# Patient Record
Sex: MALE | Race: BLACK OR AFRICAN AMERICAN | NOT HISPANIC OR LATINO | Employment: FULL TIME | URBAN - METROPOLITAN AREA
[De-identification: names, ages, dates, MRNs, and addresses within clinical notes are randomized per-mention and may not be internally consistent; named-entity substitution may affect disease eponyms.]

---

## 2020-08-12 ENCOUNTER — OFFICE VISIT (OUTPATIENT)
Dept: URGENT CARE | Facility: CLINIC | Age: 30
End: 2020-08-12
Payer: COMMERCIAL

## 2020-08-12 VITALS
BODY MASS INDEX: 43.95 KG/M2 | DIASTOLIC BLOOD PRESSURE: 85 MMHG | WEIGHT: 290 LBS | HEIGHT: 68 IN | OXYGEN SATURATION: 95 % | HEART RATE: 100 BPM | SYSTOLIC BLOOD PRESSURE: 126 MMHG | RESPIRATION RATE: 16 BRPM | TEMPERATURE: 98 F

## 2020-08-12 DIAGNOSIS — R11.0 NAUSEA: ICD-10-CM

## 2020-08-12 DIAGNOSIS — R05.9 COUGH: ICD-10-CM

## 2020-08-12 DIAGNOSIS — B34.9 VIRAL ILLNESS: ICD-10-CM

## 2020-08-12 DIAGNOSIS — R50.9 FEVER, UNSPECIFIED FEVER CAUSE: Primary | ICD-10-CM

## 2020-08-12 DIAGNOSIS — Z71.1 CONCERN ABOUT STD IN MALE WITHOUT DIAGNOSIS: ICD-10-CM

## 2020-08-12 LAB
CTP QC/QA: YES
MOLECULAR STREP A: NEGATIVE

## 2020-08-12 PROCEDURE — 99203 PR OFFICE/OUTPT VISIT, NEW, LEVL III, 30-44 MIN: ICD-10-PCS | Mod: S$GLB,,, | Performed by: FAMILY MEDICINE

## 2020-08-12 PROCEDURE — 99203 OFFICE O/P NEW LOW 30 MIN: CPT | Mod: S$GLB,,, | Performed by: FAMILY MEDICINE

## 2020-08-12 PROCEDURE — 87661 TRICHOMONAS VAGINALIS AMPLIF: CPT

## 2020-08-12 PROCEDURE — 86592 SYPHILIS TEST NON-TREP QUAL: CPT

## 2020-08-12 PROCEDURE — 87651 STREP A DNA AMP PROBE: CPT | Mod: QW,S$GLB,, | Performed by: FAMILY MEDICINE

## 2020-08-12 PROCEDURE — 86703 HIV-1/HIV-2 1 RESULT ANTBDY: CPT

## 2020-08-12 PROCEDURE — 87491 CHLMYD TRACH DNA AMP PROBE: CPT

## 2020-08-12 PROCEDURE — 87651 POCT STREP A MOLECULAR: ICD-10-PCS | Mod: QW,S$GLB,, | Performed by: FAMILY MEDICINE

## 2020-08-12 PROCEDURE — 80074 ACUTE HEPATITIS PANEL: CPT

## 2020-08-12 RX ORDER — ONDANSETRON 4 MG/1
4 TABLET, ORALLY DISINTEGRATING ORAL EVERY 6 HOURS PRN
Qty: 30 TABLET | Refills: 0 | Status: SHIPPED | OUTPATIENT
Start: 2020-08-12 | End: 2023-10-02

## 2020-08-12 RX ORDER — BENZONATATE 200 MG/1
200 CAPSULE ORAL 3 TIMES DAILY PRN
Qty: 30 CAPSULE | Refills: 0 | Status: SHIPPED | OUTPATIENT
Start: 2020-08-12 | End: 2023-10-02

## 2020-08-12 NOTE — PROGRESS NOTES
"Subjective:       Patient ID: Phil Leung is a 30 y.o. male.    Vitals:  height is 5' 8" (1.727 m) and weight is 131.5 kg (290 lb). His tympanic temperature is 98.1 °F (36.7 °C). His blood pressure is 126/85 and his pulse is 100. His respiration is 16 and oxygen saturation is 95%.     Chief Complaint: Fever and Cough    Patient reports fever for past 4 days- highest 102. Patient reports dry cough started yesterday and also nausea vomiting no blood- able to keep down fluids.Patient is waiting on covid test he had done yesterday at a community testing site.  He has not been around anyone he knows specifically is positive for COVID, no chest pain shortness of breath abdominal pain dysuria hematuria.     Patient mentions he would like STD test while he is here no specific concerns just likes to get a full panel every now and then    Fever   This is a new problem. Episode onset: 4 days. The problem occurs intermittently. The problem has been unchanged. The temperature was taken using an oral thermometer. Associated symptoms include coughing (dry off and on), headaches (off and on), muscle aches, nausea, a sore throat and vomiting. Pertinent negatives include no congestion, ear pain, rash or wheezing. He has tried nothing for the symptoms.   Risk factors: no sick contacts    Cough  This is a new problem. The current episode started yesterday. The problem has been unchanged. The problem occurs every few hours. The cough is non-productive. Associated symptoms include a fever (highest at home 102), headaches (off and on), myalgias and a sore throat. Pertinent negatives include no chills, ear pain, eye redness, hemoptysis, postnasal drip, rash, rhinorrhea, shortness of breath or wheezing. Nothing aggravates the symptoms. He has tried nothing for the symptoms. His past medical history is significant for asthma (as a child). There is no history of bronchitis.       Constitution: Positive for fever (highest at home 102). " Negative for chills, sweating and fatigue.   HENT: Positive for sore throat. Negative for ear pain, congestion, postnasal drip, sinus pain, sinus pressure and voice change.         Sores on tongue and gums swollen   Neck: Negative for painful lymph nodes.   Eyes: Negative for eye redness.   Respiratory: Positive for cough (dry off and on). Negative for chest tightness, sputum production, bloody sputum, COPD, shortness of breath, stridor, wheezing and asthma.    Gastrointestinal: Positive for nausea and vomiting.   Musculoskeletal: Positive for muscle ache.   Skin: Negative for rash.   Allergic/Immunologic: Negative for seasonal allergies and asthma.   Neurological: Positive for headaches (off and on).   Hematologic/Lymphatic: Negative for swollen lymph nodes.       Objective:      Physical Exam   Constitutional: He is oriented to person, place, and time. He appears well-developed. He is cooperative.  Non-toxic appearance. He does not appear ill. No distress.   HENT:   Head: Normocephalic and atraumatic.   Ears:   Right Ear: Hearing, tympanic membrane, external ear and ear canal normal. No middle ear effusion.   Left Ear: Hearing, tympanic membrane, external ear and ear canal normal.  No middle ear effusion.   Nose: Nose normal. No mucosal edema, rhinorrhea or nasal deformity. No epistaxis. Right sinus exhibits no maxillary sinus tenderness and no frontal sinus tenderness. Left sinus exhibits no maxillary sinus tenderness and no frontal sinus tenderness.   Mouth/Throat: Uvula is midline and mucous membranes are normal. No trismus in the jaw. Normal dentition. No uvula swelling. Posterior oropharyngeal erythema present. No oropharyngeal exudate, posterior oropharyngeal edema or tonsillar abscesses. Tonsils are 1+ on the right. Tonsils are 1+ on the left. Tonsillar exudate.       Eyes: Conjunctivae and lids are normal. No scleral icterus.   Neck: Trachea normal, full passive range of motion without pain and phonation  normal. Neck supple. No neck rigidity. No edema and no erythema present.   Cardiovascular: Normal rate, regular rhythm, normal heart sounds and normal pulses.   Pulmonary/Chest: Effort normal and breath sounds normal. No accessory muscle usage. No tachypnea. No respiratory distress. He has no decreased breath sounds. He has no wheezes. He has no rhonchi. He has no rales.   NAD able to speak in clear complete sentences without difficulty      Comments: NAD able to speak in clear complete sentences without difficulty      Abdominal: Soft. Normal appearance and bowel sounds are normal. There is no abdominal tenderness. There is no rebound, no guarding, no tenderness at McBurney's point and negative Michelle's sign. No hernia.      Comments: Abdomen soft no rebound tenderness or guarding   Genitourinary:         Comments: deferred     Musculoskeletal: Normal range of motion.         General: No deformity.   Neurological: He is alert and oriented to person, place, and time. He exhibits normal muscle tone. Coordination normal.   Skin: Skin is warm, dry, intact, not diaphoretic and not pale. Psychiatric: His speech is normal and behavior is normal. Judgment and thought content normal.   Nursing note and vitals reviewed.    Results for orders placed or performed in visit on 08/12/20   POCT Strep A, Molecular   Result Value Ref Range    Molecular Strep A, POC Negative Negative     Acceptable Yes            Assessment:       1. Fever, unspecified fever cause    2. Cough    3. Nausea    4. Viral illness    5. Concern about STD in male without diagnosis        Plan:         Fever, unspecified fever cause  -     POCT Strep A, Molecular    Cough  -     benzonatate (TESSALON) 200 MG capsule; Take 1 capsule (200 mg total) by mouth 3 (three) times daily as needed for Cough.  Dispense: 30 capsule; Refill: 0    Nausea  -     ondansetron (ZOFRAN-ODT) 4 MG TbDL; Take 1 tablet (4 mg total) by mouth every 6 (six) hours as  needed.  Dispense: 30 tablet; Refill: 0    Viral illness    Concern about STD in male without diagnosis  -     C. trachomatis/N. gonorrhoeae by AMP DNA  -     Hepatitis Panel, Acute  -     RPR  -     Trichomonas vaginalis, RNA, Qual, Urine  -     HIV 1/2 Ag/Ab (4th Gen)      Patient Instructions   PLEASE READ YOUR DISCHARGE INSTRUCTIONS ENTIRELY AS IT CONTAINS IMPORTANT INFORMATION.    Please go home and quarantine yourself until your test results    You can return to work/school  If negative: 24 hours without fever without fever reducing medications  If positive: at least 10 days from when symptoms started AND 3 days without fever without taking fever reducing medications AND improvement in respiratory symptoms (cough, chest pain, shortness of breath)    Monitor for any worsening symptoms if anything severely worsening please go to the ER immediately.     Tylenol and ibuprofen as needed    Drink plenty of fluids rest    Wash hands frequently cover your cough and sneeze    Tessalon Perles as needed for cough    Zofran as needed for nausea let it dissolve under the tongue    Greenback foods like soups crackers toast    STD testing will take about 3-5 days.  Increase condom use to prevent further occurance.  Notify sexual partners of the need for testing.  Complete ALL medication prescribed.  NO sexual intercourse for 7 days after treatment. Retest to ensure infection has cleared-there are infections that require more agressive treatment.  Retest for all ini 6 weeks and again in 6 monts to ensure true negative results.  Today's testing will give no crediable information if you have unprotected sexual activities going forward. Syphillis cases are rising!  Gonorrhea has RESISTANT strains which is why repeat testing after treatment is important.  Gonorrhea may be present in multiple sites from just ONE area of exposure.  For those who have high risk sexual behaviors and are on Truvada for PrEP- you have additional protection  against HIV ONLY.  REMEMBER WEAR CONDOMS AND GET TESTED OFTEN.   Do not have sex for 1 week, and no unprotected sex for 3 weeks.      Instructions for Home Care of Patients and Caretakers with Coronavirus Disease 2019     Limit visitors to the home.  Older persons and those that have chronic medical conditions such as diabetes, lung and heart disease are at increased risk for illness.    If possible, patients should use a separate bedroom while recovering. Caregivers and household members should avoid prolonged contact with the patient which means to stay 6 feet away and avoid contact with cough droplets.  When close contact is necessary, wash your hands before and immediately after contact.    Perform hand hygiene frequently. Wash your hands often with soap and water for at least 20 seconds or use an alcohol-based hand , covering all surfaces of your hands and rubbing them together until they feel dry.    Avoid touching your eyes, nose, and mouth with unwashed hands.   Avoid sharing household items with the patient. You should not share dishes, drinking glasses, cups, eating utensils, towels, bedding, or other items. After the patient uses these items, you should wash them thoroughly.   Wash laundry thoroughly.   o Immediately remove and wash clothes or bedding that have blood, stool, or body fluids on them.   Clean all high-touch surfaces, such as counters, tabletops, doorknobs, bathroom fixtures, toilets, phones, keyboards, tablets, and bedside tables, every day.   o Use a household cleaning spray or wipe, according to the label instructions. Labels contain instructions for safe and effective use of the cleaning product including precautions you should take when applying the product, such as wearing gloves and making sure you have good ventilation during use of the product.    For more information see CDC link below.   "    https://www.cdc.gov/coronavirus/2019-ncov/hcp/guidance-prevent-spread.html#precautions               If your symptoms worsen or if you have any other concerns, please contact [facility] at [phone].       You must understand that you have received an Urgent Care treatment only and that you may be released before all of your medical problems are known or treated.      Viral Syndrome (Adult)  A viral illness may cause a number of symptoms. The symptoms depend on the part of the body that the virus affects. If it settles in your nose, throat, and lungs, it may cause cough, sore throat, congestion, and sometimes headache. If it settles in your stomach and intestinal tract, it may cause vomiting and diarrhea. Sometimes it causes vague symptoms like "aching all over," feeling tired, loss of appetite, or fever.  A viral illness usually lasts 1 to 2 weeks, but sometimes it lasts longer. In some cases, a more serious infection can look like a viral syndrome in the first few days of the illness. You may need another exam and additional tests to know the difference. Watch for the warning signs listed below.  Home care  Follow these guidelines for taking care of yourself at home:  · If symptoms are severe, rest at home for the first 2 to 3 days.  · Stay away from cigarette smoke - both your smoke and the smoke from others.  · You may use over-the-counter acetaminophen or ibuprofen for fever, muscle aching, and headache, unless another medicine was prescribed for this. If you have chronic liver or kidney disease or ever had a stomach ulcer or GI bleeding, talk with your doctor before using these medicines. No one who is younger than 18 and ill with a fever should take aspirin. It may cause severe disease or death.  · Your appetite may be poor, so a light diet is fine. Avoid dehydration by drinking 8 to 12 8-ounce glasses of fluids each day. This may include water; orange juice; lemonade; apple, grape, and cranberry " juice; clear fruit drinks; electrolyte replacement and sports drinks; and decaffeinated teas and coffee. If you have been diagnosed with a kidney disease, ask your doctor how much and what types of fluids you should drink to prevent dehydration. If you have kidney disease, drinking too much fluid can cause it build up in the your body and be dangerous to your health.  · Over-the-counter remedies won't shorten the length of the illness but may be helpful for cough, sore throat; and nasal and sinus congestion. Don't use decongestants if you have high blood pressure.  Follow-up care  Follow up with your healthcare provider if you do not improve over the next week.  Call 911  Get emergency medical care if any of the following occur:  · Convulsion  · Feeling weak, dizzy, or like you are going to faint  · Chest pain, shortness of breath, wheezing, or difficulty breathing  When to seek medical advice  Call your healthcare provider right away if any of these occur:  · Cough with lots of colored sputum (mucus) or blood in your sputum  · Chest pain, shortness of breath, wheezing, or difficulty breathing  · Severe headache; face, neck, or ear pain  · Severe, constant pain in the lower right side of your belly (abdominal)  · Continued vomiting (cant keep liquids down)  · Frequent diarrhea (more than 5 times a day); blood (red or black color) or mucus in diarrhea  · Feeling weak, dizzy, or like you are going to faint  · Extreme thirst  · Fever of 100.4°F (38°C) or higher, or as directed by your healthcare provider  Date Last Reviewed: 9/25/2015 © 2000-2017 [employer]. [address] [place] [address], [place] [address]. All rights reserved. This information is not intended as a substitute for professional medical care. Always follow your healthcare professional's instructions.      Instructions for Patients with Confirmed or Suspected COVID-19    If you are awaiting your test result, you will either be called or it will  be released to the patient portal.  If you have any questions about your test, please visit [email or web address] or call our COVID-19 information line at [phone].      Instructions for non-hospitalized or discharged patients with confirmed or suspected COVID-19:       Stay home except to get medical care.    Separate yourself from other people and animals in your home.    Call ahead before visiting your doctor.    Wear a face mask.    Cover your coughs and sneezes.    Clean your hands often.    Avoid sharing personal household items.    Clean all high-touch surfaces every day.    Monitor your symptoms. Seek prompt medical attention if your illness is worsening (e.g., difficulty breathing). Before seeking care, call your healthcare provider.    If you have a medical emergency and must call 911, notify the dispatcher that you have or are being evaluated for COVID-19. If possible, put on a face mask before emergency medical services arrive.    Use the following symptom-based strategy to return to normal activity following a suspected or confirmed case of COVID-19. Continue isolation until:   o At least 3 days (72 hours) have passed since recovery defined as resolution of fever without the use of fever-reducing medications and improvement in respiratory symptoms (e.g. cough, shortness of breath), and   o At least 10 days have passed since the first positive test.       As one of the next steps, you will receive a call or text from the [place] Department of Health ([facility]) COVID-19 Tracing Team. See the contact information below so you know not to ignore the health departments call. It is important that you contact them back immediately so they can help.     Contact Tracer Number:  [phone]  Caller ID for most carriers: [place] Dept Health    What is contact tracing?   Contact tracing is a process that helps identify everyone who has been in close contact with an infected person. Contact  tracers let those people know they may have been exposed and guide them on next steps. Confidentiality is important for everyone; no one will be told who may have exposed them to the virus.   Your involvement is important. The more we know about where and how this virus is spreading, the better chance we have at stopping it from spreading further.  What does exposure mean?   Exposure means you have been within 6 feet for more than 15 minutes with a person who has or had COVID-19.  What kind of questions do the contact tracers ask?   A contact tracer will confirm your basic contact information including name, address, phone number, and next of kin, as well as asking about any symptoms you may have had. Theyll also ask you how you think you may have gotten sick, such as places where you may have been exposed to the virus, and people you were with. Those names will never be shared with anyone outside of that call, and will only be used to help trace and stop the spread of the virus.   I have privacy concerns. How will the state use my information?   Your privacy about your health is important. All calls are completed using call centers that use the appropriate health privacy protection measures (HIPAA compliance), meaning that your patient information is safe. No one will ever ask you any questions related to immigration status. Your health comes first.   Do I have to participate?   You do not have to participate, but we strongly encourage you to. Contact tracing can help us catch and control new outbreaks as theyre developing to keep your friends and family safe.   What if I dont hear from anyone?   If you dont receive a call within 24 hours, you can call the number above right away to inquire about your status. That line is open from 8:00 am - 8:00 p.m., 7 days a week.  Contact tracing saves lives! Together, we have the power to beat this virus and keep our loved ones and neighbors safe.       Instructions  for household members, intimate partners and caregivers in a non-healthcare setting of a patient with confirmed or suspected COVID-19:         Close contacts should monitor their health and call their healthcare provider right away if they develop symptoms suggestive of COVID-19 (e.g., fever, cough, shortness of breath).    Stay home except to get medical care. Separate yourself from other people and animals in the home.   Monitor the patients symptoms. If the patient is getting sicker, call his or her healthcare provider. If the patient has a medical emergency and you need to call 911, notify the dispatch personnel that the patient has or is being evaluated for COVID-19.    Wear a facemask when around other people such as sharing a room or vehicle and before entering a healthcare provider's office.   Cover coughs and sneezes with a tissue. Throw used tissues in a lined trash can immediately and wash hands.   Clean hands often with soap and water for at least 20 seconds or with an alcohol-based hand , rubbing hands together until they feel dry. Avoid touching your eyes, nose, and mouth with unwashed hands.   Clean all high-touch; surfaces every day, including counters, tabletops, doorknobs, bathroom fixtures, toilets, phones, keyboards, tablets, bedside tables, etc. Use a household cleaning spray or wipe according to label instructions.   Avoid sharing personal household items such as dishes, drinking glasses, cups, towels, bedding, etc. After these items are used, they should be washed thoroughly with soap and water.   Continue isolation until:   At least 3 days (72 hours) have passed since recovery defined as resolution of fever without the use of fever-reducing medications and improvement in respiratory symptoms (e.g. cough, shortness of breath), and    At least 10 days have passed since the patients first positive  test.    https://www.cdc.gov/coronavirus/2019-ncov/your-health/index.htm

## 2020-08-12 NOTE — LETTER
[address] [facility] ? [place], [address] ? Phone [phone] ? Fax [phone]           Return to Work/School Status    Patient: Phil Leung  YOB: 1990   Date: 08/12/2020      [facility] has adopted CDCs time-based return to work/school strategy for persons with confirmed or suspected COVID19. [facility] does not recommend using a test-based strategy for returning to work/school after COVID-19 infection. CDC has reported prolonged positive test results without evidence of infectiousness. At this time, positive specimens capable of producing disease have not been isolated more than 9 days after onset of illness.   Symptomatic persons with confirmed COVID-19 or suspected COVID-19 can return to work/school after:    At least 3 days (72 hours) have passed since recovery defined as resolution of fever without the use of fever-reducing medications AND improvement in respiratory symptoms (e.g., cough, shortness of breath)    AND, at least 10 days have passed since first positive test  Asymptomatic persons with confirmed COVID-19 can return to work after:   At least 10 days have passed since the positive laboratory test and the person remains asymptomatic.  More information about the science behind the symptom-based return to work/school can be found at: https://www.cdc.gov/coronavirus/2019-ncov/community/wpmoxcuk-bqeshvymvtq-brslqzebd.html    Sincerely,      Pierre Thomas NP

## 2020-08-12 NOTE — PATIENT INSTRUCTIONS
PLEASE READ YOUR DISCHARGE INSTRUCTIONS ENTIRELY AS IT CONTAINS IMPORTANT INFORMATION.    Please go home and quarantine yourself until your test results    You can return to work/school  If negative: 24 hours without fever without fever reducing medications  If positive: at least 10 days from when symptoms started AND 3 days without fever without taking fever reducing medications AND improvement in respiratory symptoms (cough, chest pain, shortness of breath)    Monitor for any worsening symptoms if anything severely worsening please go to the ER immediately.     Tylenol and ibuprofen as needed    Drink plenty of fluids rest    Wash hands frequently cover your cough and sneeze    Tessalon Perles as needed for cough    Zofran as needed for nausea let it dissolve under the tongue    Hand foods like soups crackers toast    STD testing will take about 3-5 days.  Increase condom use to prevent further occurance.  Notify sexual partners of the need for testing.  Complete ALL medication prescribed.  NO sexual intercourse for 7 days after treatment. Retest to ensure infection has cleared-there are infections that require more agressive treatment.  Retest for all ini 6 weeks and again in 6 monts to ensure true negative results.  Today's testing will give no crediable information if you have unprotected sexual activities going forward. Syphillis cases are rising!  Gonorrhea has RESISTANT strains which is why repeat testing after treatment is important.  Gonorrhea may be present in multiple sites from just ONE area of exposure.  For those who have high risk sexual behaviors and are on Truvada for PrEP- you have additional protection against HIV ONLY.  REMEMBER WEAR CONDOMS AND GET TESTED OFTEN.   Do not have sex for 1 week, and no unprotected sex for 3 weeks.      Instructions for Home Care of Patients and Caretakers with Coronavirus Disease 2019     Limit visitors to the home.  Older persons and those that have chronic  medical conditions such as diabetes, lung and heart disease are at increased risk for illness.    If possible, patients should use a separate bedroom while recovering. Caregivers and household members should avoid prolonged contact with the patient which means to stay 6 feet away and avoid contact with cough droplets.  When close contact is necessary, wash your hands before and immediately after contact.    Perform hand hygiene frequently. Wash your hands often with soap and water for at least 20 seconds or use an alcohol-based hand , covering all surfaces of your hands and rubbing them together until they feel dry.    Avoid touching your eyes, nose, and mouth with unwashed hands.   Avoid sharing household items with the patient. You should not share dishes, drinking glasses, cups, eating utensils, towels, bedding, or other items. After the patient uses these items, you should wash them thoroughly.   Wash laundry thoroughly.   o Immediately remove and wash clothes or bedding that have blood, stool, or body fluids on them.   Clean all high-touch surfaces, such as counters, tabletops, doorknobs, bathroom fixtures, toilets, phones, keyboards, tablets, and bedside tables, every day.   o Use a household cleaning spray or wipe, according to the label instructions. Labels contain instructions for safe and effective use of the cleaning product including precautions you should take when applying the product, such as wearing gloves and making sure you have good ventilation during use of the product.    For more information see CDC link below.      https://www.cdc.gov/coronavirus/2019-ncov/hcp/guidance-prevent-spread.html#precautions               If your symptoms worsen or if you have any other concerns, please contact [facility] at [phone].       You must understand that you have received an Urgent Care treatment only and that you may be released before all of your medical problems are known or  "treated.      Viral Syndrome (Adult)  A viral illness may cause a number of symptoms. The symptoms depend on the part of the body that the virus affects. If it settles in your nose, throat, and lungs, it may cause cough, sore throat, congestion, and sometimes headache. If it settles in your stomach and intestinal tract, it may cause vomiting and diarrhea. Sometimes it causes vague symptoms like "aching all over," feeling tired, loss of appetite, or fever.  A viral illness usually lasts 1 to 2 weeks, but sometimes it lasts longer. In some cases, a more serious infection can look like a viral syndrome in the first few days of the illness. You may need another exam and additional tests to know the difference. Watch for the warning signs listed below.  Home care  Follow these guidelines for taking care of yourself at home:  · If symptoms are severe, rest at home for the first 2 to 3 days.  · Stay away from cigarette smoke - both your smoke and the smoke from others.  · You may use over-the-counter acetaminophen or ibuprofen for fever, muscle aching, and headache, unless another medicine was prescribed for this. If you have chronic liver or kidney disease or ever had a stomach ulcer or GI bleeding, talk with your doctor before using these medicines. No one who is younger than 18 and ill with a fever should take aspirin. It may cause severe disease or death.  · Your appetite may be poor, so a light diet is fine. Avoid dehydration by drinking 8 to 12 8-ounce glasses of fluids each day. This may include water; orange juice; lemonade; apple, grape, and cranberry juice; clear fruit drinks; electrolyte replacement and sports drinks; and decaffeinated teas and coffee. If you have been diagnosed with a kidney disease, ask your doctor how much and what types of fluids you should drink to prevent dehydration. If you have kidney disease, drinking too much fluid can cause it build up in the your body and be dangerous to your " health.  · Over-the-counter remedies won't shorten the length of the illness but may be helpful for cough, sore throat; and nasal and sinus congestion. Don't use decongestants if you have high blood pressure.  Follow-up care  Follow up with your healthcare provider if you do not improve over the next week.  Call 911  Get emergency medical care if any of the following occur:  · Convulsion  · Feeling weak, dizzy, or like you are going to faint  · Chest pain, shortness of breath, wheezing, or difficulty breathing  When to seek medical advice  Call your healthcare provider right away if any of these occur:  · Cough with lots of colored sputum (mucus) or blood in your sputum  · Chest pain, shortness of breath, wheezing, or difficulty breathing  · Severe headache; face, neck, or ear pain  · Severe, constant pain in the lower right side of your belly (abdominal)  · Continued vomiting (cant keep liquids down)  · Frequent diarrhea (more than 5 times a day); blood (red or black color) or mucus in diarrhea  · Feeling weak, dizzy, or like you are going to faint  · Extreme thirst  · Fever of 100.4°F (38°C) or higher, or as directed by your healthcare provider  Date Last Reviewed: 9/25/2015  © 8926-8242 [employer]. [address] [place] [address], [place] [address]. All rights reserved. This information is not intended as a substitute for professional medical care. Always follow your healthcare professional's instructions.      Instructions for Patients with Confirmed or Suspected COVID-19    If you are awaiting your test result, you will either be called or it will be released to the patient portal.  If you have any questions about your test, please visit [email or web address] or call our COVID-19 information line at [phone].      Instructions for non-hospitalized or discharged patients with confirmed or suspected COVID-19:       Stay home except to get medical care.    Separate yourself from other people  and animals in your home.    Call ahead before visiting your doctor.    Wear a face mask.    Cover your coughs and sneezes.    Clean your hands often.    Avoid sharing personal household items.    Clean all high-touch surfaces every day.    Monitor your symptoms. Seek prompt medical attention if your illness is worsening (e.g., difficulty breathing). Before seeking care, call your healthcare provider.    If you have a medical emergency and must call 911, notify the dispatcher that you have or are being evaluated for COVID-19. If possible, put on a face mask before emergency medical services arrive.    Use the following symptom-based strategy to return to normal activity following a suspected or confirmed case of COVID-19. Continue isolation until:   o At least 3 days (72 hours) have passed since recovery defined as resolution of fever without the use of fever-reducing medications and improvement in respiratory symptoms (e.g. cough, shortness of breath), and   o At least 10 days have passed since the first positive test.       As one of the next steps, you will receive a call or text from the [place] Department of Health ([facility]) COVID-19 Tracing Team. See the contact information below so you know not to ignore the health departments call. It is important that you contact them back immediately so they can help.     Contact Tracer Number:  [phone]  Caller ID for most carriers: [facility]    What is contact tracing?   Contact tracing is a process that helps identify everyone who has been in close contact with an infected person. Contact tracers let those people know they may have been exposed and guide them on next steps. Confidentiality is important for everyone; no one will be told who may have exposed them to the virus.   Your involvement is important. The more we know about where and how this virus is spreading, the better chance we have at stopping it from spreading further.  What does  exposure mean?   Exposure means you have been within 6 feet for more than 15 minutes with a person who has or had COVID-19.  What kind of questions do the contact tracers ask?   A contact tracer will confirm your basic contact information including name, address, phone number, and next of kin, as well as asking about any symptoms you may have had. Theyll also ask you how you think you may have gotten sick, such as places where you may have been exposed to the virus, and people you were with. Those names will never be shared with anyone outside of that call, and will only be used to help trace and stop the spread of the virus.   I have privacy concerns. How will the state use my information?   Your privacy about your health is important. All calls are completed using call centers that use the appropriate health privacy protection measures (HIPAA compliance), meaning that your patient information is safe. No one will ever ask you any questions related to immigration status. Your health comes first.   Do I have to participate?   You do not have to participate, but we strongly encourage you to. Contact tracing can help us catch and control new outbreaks as theyre developing to keep your friends and family safe.   What if I dont hear from anyone?   If you dont receive a call within 24 hours, you can call the number above right away to inquire about your status. That line is open from 8:00 am - 8:00 p.m., 7 days a week.  Contact tracing saves lives! Together, we have the power to beat this virus and keep our loved ones and neighbors safe.       Instructions for household members, intimate partners and caregivers in a non-healthcare setting of a patient with confirmed or suspected COVID-19:         Close contacts should monitor their health and call their healthcare provider right away if they develop symptoms suggestive of COVID-19 (e.g., fever, cough, shortness of breath).    Stay home except to get medical  care. Separate yourself from other people and animals in the home.   Monitor the patients symptoms. If the patient is getting sicker, call his or her healthcare provider. If the patient has a medical emergency and you need to call 911, notify the dispatch personnel that the patient has or is being evaluated for COVID-19.    Wear a facemask when around other people such as sharing a room or vehicle and before entering a healthcare provider's office.   Cover coughs and sneezes with a tissue. Throw used tissues in a lined trash can immediately and wash hands.   Clean hands often with soap and water for at least 20 seconds or with an alcohol-based hand , rubbing hands together until they feel dry. Avoid touching your eyes, nose, and mouth with unwashed hands.   Clean all high-touch; surfaces every day, including counters, tabletops, doorknobs, bathroom fixtures, toilets, phones, keyboards, tablets, bedside tables, etc. Use a household cleaning spray or wipe according to label instructions.   Avoid sharing personal household items such as dishes, drinking glasses, cups, towels, bedding, etc. After these items are used, they should be washed thoroughly with soap and water.   Continue isolation until:   At least 3 days (72 hours) have passed since recovery defined as resolution of fever without the use of fever-reducing medications and improvement in respiratory symptoms (e.g. cough, shortness of breath), and    At least 10 days have passed since the patients first positive test.    https://www.cdc.gov/coronavirus/2019-ncov/your-health/index.htm

## 2020-08-13 LAB
HAV IGM SERPL QL IA: NEGATIVE
HBV CORE IGM SERPL QL IA: NEGATIVE
HBV SURFACE AG SERPL QL IA: NEGATIVE
HCV AB SERPL QL IA: NEGATIVE
HIV 1+2 AB+HIV1 P24 AG SERPL QL IA: NEGATIVE
RPR SER QL: NORMAL

## 2020-08-17 LAB
T VAGINALIS RRNA SPEC QL NAA+PROBE: NOT DETECTED
TRICHOMONAS VAGINALIS RNA, QUAL, SOURCE: NORMAL

## 2020-08-18 LAB
C TRACH DNA SPEC QL NAA+PROBE: NOT DETECTED
N GONORRHOEA DNA SPEC QL NAA+PROBE: NOT DETECTED

## 2020-11-24 ENCOUNTER — CLINICAL SUPPORT (OUTPATIENT)
Dept: URGENT CARE | Facility: CLINIC | Age: 30
End: 2020-11-24
Payer: COMMERCIAL

## 2020-11-24 DIAGNOSIS — Z11.59 SCREENING FOR VIRAL DISEASE: Primary | ICD-10-CM

## 2020-11-24 LAB
CTP QC/QA: YES
SARS-COV-2 RDRP RESP QL NAA+PROBE: NEGATIVE

## 2020-11-24 PROCEDURE — U0002 COVID-19 LAB TEST NON-CDC: HCPCS | Mod: QW,S$GLB,, | Performed by: PHYSICIAN ASSISTANT

## 2020-11-24 PROCEDURE — U0002: ICD-10-PCS | Mod: QW,S$GLB,, | Performed by: PHYSICIAN ASSISTANT

## 2023-09-11 ENCOUNTER — OFFICE VISIT (OUTPATIENT)
Dept: URGENT CARE | Facility: CLINIC | Age: 33
End: 2023-09-11
Payer: COMMERCIAL

## 2023-09-11 VITALS
DIASTOLIC BLOOD PRESSURE: 88 MMHG | RESPIRATION RATE: 16 BRPM | WEIGHT: 289.88 LBS | HEIGHT: 68 IN | SYSTOLIC BLOOD PRESSURE: 129 MMHG | OXYGEN SATURATION: 99 % | BODY MASS INDEX: 43.93 KG/M2 | TEMPERATURE: 98 F | HEART RATE: 78 BPM

## 2023-09-11 DIAGNOSIS — N48.89 FORESKIN SWELLING: ICD-10-CM

## 2023-09-11 DIAGNOSIS — N47.8 FORESKIN DOES NOT RETRACT: ICD-10-CM

## 2023-09-11 DIAGNOSIS — N48.1 BALANITIS: Primary | ICD-10-CM

## 2023-09-11 LAB
BILIRUB UR QL STRIP: NEGATIVE
GLUCOSE UR QL STRIP: POSITIVE
KETONES UR QL STRIP: NEGATIVE
LEUKOCYTE ESTERASE UR QL STRIP: NEGATIVE
PH, POC UA: 5.5 (ref 5–8)
POC BLOOD, URINE: NEGATIVE
POC NITRATES, URINE: NEGATIVE
PROT UR QL STRIP: NEGATIVE
SP GR UR STRIP: 1.02 (ref 1–1.03)
UROBILINOGEN UR STRIP-ACNC: NORMAL (ref 0.3–2.2)

## 2023-09-11 PROCEDURE — 99203 OFFICE O/P NEW LOW 30 MIN: CPT | Mod: S$GLB,,, | Performed by: NURSE PRACTITIONER

## 2023-09-11 PROCEDURE — 81003 URINALYSIS AUTO W/O SCOPE: CPT | Mod: QW,S$GLB,, | Performed by: NURSE PRACTITIONER

## 2023-09-11 PROCEDURE — 81003 POCT URINALYSIS, DIPSTICK, AUTOMATED, W/O SCOPE: ICD-10-PCS | Mod: QW,S$GLB,, | Performed by: NURSE PRACTITIONER

## 2023-09-11 PROCEDURE — 99203 PR OFFICE/OUTPT VISIT, NEW, LEVL III, 30-44 MIN: ICD-10-PCS | Mod: S$GLB,,, | Performed by: NURSE PRACTITIONER

## 2023-09-11 RX ORDER — CLOTRIMAZOLE AND BETAMETHASONE DIPROPIONATE 10; .64 MG/G; MG/G
CREAM TOPICAL 2 TIMES DAILY
Qty: 45 G | Refills: 0 | Status: SHIPPED | OUTPATIENT
Start: 2023-09-11 | End: 2023-09-18

## 2023-09-11 NOTE — PROGRESS NOTES
"Subjective:      Patient ID: Phil Leung is a 33 y.o. male.    Vitals:  height is 5' 8" (1.727 m) and weight is 131.5 kg (289 lb 14.5 oz). His oral temperature is 98.2 °F (36.8 °C). His blood pressure is 129/88 and his pulse is 78. His respiration is 16 and oxygen saturation is 99%.     Chief Complaint: Penis Pain    32yo male pt reports some swelling and difficulty retracting foreskin of penis that started yesterday.  Reports that he is able to urinate without problem.  Denies fever/chills, denies abd/back/flank pain, denies penile discharge.  Reports some redness to foreskin.  Denies HX of similar symptoms.    Penis Pain  The patient's primary symptoms include penile pain. The patient's pertinent negatives include no pelvic pain, penile discharge, scrotal swelling or testicular pain. The pain is mild. Pertinent negatives include no abdominal pain, anorexia, chest pain, chills, constipation, coughing, diarrhea, discolored urine, dysuria, fever, flank pain, frequency, headaches, hematuria, hesitancy, joint pain, joint swelling, nausea, painful intercourse, rash, shortness of breath, sore throat, urgency, urinary retention or vomiting. The symptoms are aggravated by urination (position). He has tried nothing for the symptoms. He is sexually active. No, his partner does not have an STD. There is no history of BPH, chlamydia, cryptorchidism, erectile aid use, erectile dysfunction, a femoral hernia, gonorrhea, herpes simplex, HIV, an inguinal hernia, kidney stones, prostatitis, sickle cell disease, syphilis or varicocele.       Constitution: Negative for chills and fever.   HENT:  Negative for sore throat.    Cardiovascular:  Negative for chest pain.   Respiratory:  Negative for cough and shortness of breath.    Gastrointestinal:  Negative for abdominal pain, nausea, vomiting, constipation and diarrhea.   Genitourinary:  Positive for penile pain. Negative for dysuria, frequency, urgency, urine decreased, flank " pain, genital trauma, genital sore, penile discharge, painful ejaculation, penile swelling, scrotal swelling, testicular pain and pelvic pain.   Skin:  Negative for rash.   Neurological:  Negative for headaches.      Objective:     Physical Exam   Constitutional: He is oriented to person, place, and time. He appears well-developed. No distress.   HENT:   Head: Normocephalic and atraumatic.   Ears:   Right Ear: External ear normal.   Left Ear: External ear normal.   Nose: Nose normal. No nasal deformity. No epistaxis.   Mouth/Throat: Oropharynx is clear and moist and mucous membranes are normal.   Eyes: Conjunctivae and lids are normal.   Neck: Trachea normal and phonation normal. Neck supple.   Cardiovascular: Normal rate, regular rhythm and normal heart sounds.   Pulmonary/Chest: Effort normal and breath sounds normal.   Abdominal: Normal appearance and bowel sounds are normal. He exhibits no distension. Soft. There is no abdominal tenderness.   Genitourinary:    Testes normal.   Uncircumcised. Phimosis, penile erythema (to center of foreskin when retraction attempted, shiny appearance with scant drainage), penile tenderness and penile swelling present. Penis exhibits no lesions. Discharge found.   Musculoskeletal: Normal range of motion.         General: Normal range of motion.   Neurological: He is alert and oriented to person, place, and time. He has normal reflexes.   Skin: Skin is warm, dry, intact and not diaphoretic.   Psychiatric: His speech is normal and behavior is normal. Judgment and thought content normal.   Nursing note and vitals reviewed.chaperone present         Results for orders placed or performed in visit on 09/11/23   POCT Urinalysis, Dipstick, Automated, W/O Scope   Result Value Ref Range    POC Blood, Urine Negative Negative    POC Bilirubin, Urine Negative Negative    POC Urobilinogen, Urine NORMAL 0.3 - 2.2    POC Ketones, Urine Negative Negative    POC Protein, Urine Negative Negative     POC Nitrates, Urine Negative Negative    POC Glucose, Urine Positive (A) Negative    pH, UA 5.5 (A) 5 - 8    POC Specific Gravity, Urine 1.020 1.003 - 1.029    POC Leukocytes, Urine Negative Negative         Assessment:     1. Balanitis    2. Foreskin swelling    3. Foreskin does not retract        Plan:     Provided education on prescribed medications, recommended cleansing with gentle soap as well.  Provided referral to Urology if symptoms do not improve with treatment, provided  number.  Provided education on return/ER precautions.  Pt verbalized understanding and agreed to plan.      Balanitis  -     POCT Urinalysis, Dipstick, Automated, W/O Scope  -     clotrimazole-betamethasone 1-0.05% (LOTRISONE) cream; Apply topically 2 (two) times daily. for 7 days  Dispense: 45 g; Refill: 0  -     Ambulatory referral/consult to Urology    Foreskin swelling    Foreskin does not retract      Patient Instructions   You have been provided a referral to Urology.  Please call [phone] to schedule an appointment at your convenience.    -----    If your condition worsens or fails to improve, we recommend that you receive another evaluation at the ER immediately, contact your PCP to discuss your concerns, or return here.  You must understand that you've received an urgent care treatment only, and that you may be released before all your medical problems are known or treated.  You, the patient, will arrange for follow-up care as instructed.

## 2023-09-11 NOTE — PATIENT INSTRUCTIONS
You have been provided a referral to Urology.  Please call [phone] to schedule an appointment at your convenience.    -----    If your condition worsens or fails to improve, we recommend that you receive another evaluation at the ER immediately, contact your PCP to discuss your concerns, or return here.  You must understand that you've received an urgent care treatment only, and that you may be released before all your medical problems are known or treated.  You, the patient, will arrange for follow-up care as instructed.

## 2023-10-02 ENCOUNTER — HOSPITAL ENCOUNTER (OUTPATIENT)
Facility: HOSPITAL | Age: 33
Discharge: HOME | End: 2023-10-03
Attending: EMERGENCY MEDICINE | Admitting: EMERGENCY MEDICINE
Payer: COMMERCIAL

## 2023-10-02 ENCOUNTER — OFFICE VISIT (OUTPATIENT)
Dept: URGENT CARE | Facility: CLINIC | Age: 33
End: 2023-10-02
Payer: COMMERCIAL

## 2023-10-02 VITALS
DIASTOLIC BLOOD PRESSURE: 95 MMHG | RESPIRATION RATE: 16 BRPM | BODY MASS INDEX: 40.16 KG/M2 | WEIGHT: 265 LBS | TEMPERATURE: 98 F | SYSTOLIC BLOOD PRESSURE: 140 MMHG | OXYGEN SATURATION: 98 % | HEIGHT: 68 IN | HEART RATE: 81 BPM

## 2023-10-02 DIAGNOSIS — R82.4 KETONURIA: ICD-10-CM

## 2023-10-02 DIAGNOSIS — R06.02 SHORTNESS OF BREATH: ICD-10-CM

## 2023-10-02 DIAGNOSIS — R63.1 POLYDIPSIA: ICD-10-CM

## 2023-10-02 DIAGNOSIS — E11.65 TYPE 2 DIABETES MELLITUS WITH HYPERGLYCEMIA, WITHOUT LONG-TERM CURRENT USE OF INSULIN: ICD-10-CM

## 2023-10-02 DIAGNOSIS — R35.0 FREQUENCY OF URINATION: Primary | ICD-10-CM

## 2023-10-02 DIAGNOSIS — E11.9 DIABETES MELLITUS, NEW ONSET: Primary | ICD-10-CM

## 2023-10-02 DIAGNOSIS — R73.9 HYPERGLYCEMIA: ICD-10-CM

## 2023-10-02 DIAGNOSIS — E66.01 CLASS 3 SEVERE OBESITY DUE TO EXCESS CALORIES WITH SERIOUS COMORBIDITY AND BODY MASS INDEX (BMI) OF 40.0 TO 44.9 IN ADULT: ICD-10-CM

## 2023-10-02 LAB
ALBUMIN SERPL BCP-MCNC: 4.3 G/DL (ref 3.5–5.2)
ALLENS TEST: ABNORMAL
ALP SERPL-CCNC: 152 U/L (ref 55–135)
ALT SERPL W/O P-5'-P-CCNC: 55 U/L (ref 10–44)
ANION GAP SERPL CALC-SCNC: 12 MMOL/L (ref 8–16)
AST SERPL-CCNC: 47 U/L (ref 10–40)
B-OH-BUTYR BLD STRIP-SCNC: 3.2 MMOL/L (ref 0–0.5)
BACTERIA #/AREA URNS AUTO: NORMAL /HPF
BASOPHILS # BLD AUTO: 0.04 K/UL (ref 0–0.2)
BASOPHILS NFR BLD: 0.5 % (ref 0–1.9)
BILIRUB SERPL-MCNC: 0.6 MG/DL (ref 0.1–1)
BILIRUB UR QL STRIP: NEGATIVE
BILIRUB UR QL STRIP: NEGATIVE
BUN SERPL-MCNC: 9 MG/DL (ref 6–20)
CALCIUM SERPL-MCNC: 9.7 MG/DL (ref 8.7–10.5)
CHLORIDE SERPL-SCNC: 101 MMOL/L (ref 95–110)
CLARITY UR REFRACT.AUTO: CLEAR
CO2 SERPL-SCNC: 25 MMOL/L (ref 23–29)
COLOR UR AUTO: ABNORMAL
CREAT SERPL-MCNC: 1.2 MG/DL (ref 0.5–1.4)
DELSYS: ABNORMAL
DIFFERENTIAL METHOD: ABNORMAL
EOSINOPHIL # BLD AUTO: 0.1 K/UL (ref 0–0.5)
EOSINOPHIL NFR BLD: 1.5 % (ref 0–8)
ERYTHROCYTE [DISTWIDTH] IN BLOOD BY AUTOMATED COUNT: 12.4 % (ref 11.5–14.5)
EST. GFR  (NO RACE VARIABLE): >60 ML/MIN/1.73 M^2
ESTIMATED AVG GLUCOSE: 283 MG/DL (ref 68–131)
GLUCOSE SERPL-MCNC: 395 MG/DL (ref 70–110)
GLUCOSE SERPL-MCNC: 465 MG/DL (ref 70–110)
GLUCOSE SERPL-MCNC: 500 MG/DL (ref 70–110)
GLUCOSE UR QL STRIP: ABNORMAL
GLUCOSE UR QL STRIP: POSITIVE
HBA1C MFR BLD: 11.5 % (ref 4–5.6)
HCO3 UR-SCNC: 23.1 MMOL/L (ref 24–28)
HCT VFR BLD AUTO: 45.9 % (ref 40–54)
HCT VFR BLD CALC: 44 %PCV (ref 36–54)
HCV AB SERPL QL IA: NORMAL
HGB BLD-MCNC: 15.4 G/DL (ref 14–18)
HGB UR QL STRIP: NEGATIVE
HIV 1+2 AB+HIV1 P24 AG SERPL QL IA: NORMAL
IMM GRANULOCYTES # BLD AUTO: 0.02 K/UL (ref 0–0.04)
IMM GRANULOCYTES NFR BLD AUTO: 0.2 % (ref 0–0.5)
KETONES UR QL STRIP: ABNORMAL
KETONES UR QL STRIP: POSITIVE
LEUKOCYTE ESTERASE UR QL STRIP: NEGATIVE
LEUKOCYTE ESTERASE UR QL STRIP: NEGATIVE
LYMPHOCYTES # BLD AUTO: 2.2 K/UL (ref 1–4.8)
LYMPHOCYTES NFR BLD: 25.6 % (ref 18–48)
MAGNESIUM SERPL-MCNC: 1.8 MG/DL (ref 1.6–2.6)
MCH RBC QN AUTO: 27.1 PG (ref 27–31)
MCHC RBC AUTO-ENTMCNC: 33.6 G/DL (ref 32–36)
MCV RBC AUTO: 81 FL (ref 82–98)
MICROSCOPIC COMMENT: NORMAL
MONOCYTES # BLD AUTO: 0.6 K/UL (ref 0.3–1)
MONOCYTES NFR BLD: 7.3 % (ref 4–15)
NEUTROPHILS # BLD AUTO: 5.6 K/UL (ref 1.8–7.7)
NEUTROPHILS NFR BLD: 64.9 % (ref 38–73)
NITRITE UR QL STRIP: NEGATIVE
NRBC BLD-RTO: 0 /100 WBC
OSMOLALITY SERPL: 316 MOSM/KG (ref 280–300)
PCO2 BLDA: 42.6 MMHG (ref 35–45)
PH SMN: 7.34 [PH] (ref 7.35–7.45)
PH UR STRIP: 5 [PH] (ref 5–8)
PH, POC UA: 5
PHOSPHATE SERPL-MCNC: 3.6 MG/DL (ref 2.7–4.5)
PLATELET # BLD AUTO: 225 K/UL (ref 150–450)
PMV BLD AUTO: 12 FL (ref 9.2–12.9)
PO2 BLDA: 39 MMHG (ref 40–60)
POC BE: -3 MMOL/L
POC BLOOD, URINE: NEGATIVE
POC IONIZED CALCIUM: 1.23 MMOL/L (ref 1.06–1.42)
POC NITRATES, URINE: NEGATIVE
POC SATURATED O2: 70 % (ref 95–100)
POC TCO2: 24 MMOL/L (ref 24–29)
POCT GLUCOSE: 419 MG/DL (ref 70–110)
POTASSIUM BLD-SCNC: 3.9 MMOL/L (ref 3.5–5.1)
POTASSIUM SERPL-SCNC: 3.9 MMOL/L (ref 3.5–5.1)
PROT SERPL-MCNC: 7.3 G/DL (ref 6–8.4)
PROT UR QL STRIP: NEGATIVE
PROT UR QL STRIP: NEGATIVE
RBC # BLD AUTO: 5.68 M/UL (ref 4.6–6.2)
RBC #/AREA URNS AUTO: 1 /HPF (ref 0–4)
SAMPLE: ABNORMAL
SITE: ABNORMAL
SODIUM BLD-SCNC: 137 MMOL/L (ref 136–145)
SODIUM SERPL-SCNC: 138 MMOL/L (ref 136–145)
SP GR UR STRIP: 1.01 (ref 1–1.03)
SP GR UR STRIP: >=1.03 (ref 1–1.03)
SQUAMOUS #/AREA URNS AUTO: 0 /HPF
URN SPEC COLLECT METH UR: ABNORMAL
UROBILINOGEN UR STRIP-ACNC: NORMAL (ref 0.3–2.2)
WBC # BLD AUTO: 8.66 K/UL (ref 3.9–12.7)
WBC #/AREA URNS AUTO: 2 /HPF (ref 0–5)
YEAST UR QL AUTO: NORMAL

## 2023-10-02 PROCEDURE — 99215 OFFICE O/P EST HI 40 MIN: CPT | Mod: S$GLB,,, | Performed by: FAMILY MEDICINE

## 2023-10-02 PROCEDURE — G0378 HOSPITAL OBSERVATION PER HR: HCPCS

## 2023-10-02 PROCEDURE — 86803 HEPATITIS C AB TEST: CPT | Performed by: PHYSICIAN ASSISTANT

## 2023-10-02 PROCEDURE — 81001 URINALYSIS AUTO W/SCOPE: CPT | Performed by: PHYSICIAN ASSISTANT

## 2023-10-02 PROCEDURE — 80053 COMPREHEN METABOLIC PANEL: CPT | Performed by: EMERGENCY MEDICINE

## 2023-10-02 PROCEDURE — 96361 HYDRATE IV INFUSION ADD-ON: CPT

## 2023-10-02 PROCEDURE — 96375 TX/PRO/DX INJ NEW DRUG ADDON: CPT

## 2023-10-02 PROCEDURE — 82010 KETONE BODYS QUAN: CPT | Performed by: EMERGENCY MEDICINE

## 2023-10-02 PROCEDURE — 96374 THER/PROPH/DIAG INJ IV PUSH: CPT

## 2023-10-02 PROCEDURE — 99900035 HC TECH TIME PER 15 MIN (STAT)

## 2023-10-02 PROCEDURE — 87389 HIV-1 AG W/HIV-1&-2 AB AG IA: CPT | Performed by: PHYSICIAN ASSISTANT

## 2023-10-02 PROCEDURE — 81003 POCT URINALYSIS, DIPSTICK, AUTOMATED, W/O SCOPE: ICD-10-PCS | Mod: QW,S$GLB,, | Performed by: FAMILY MEDICINE

## 2023-10-02 PROCEDURE — 82962 GLUCOSE BLOOD TEST: CPT

## 2023-10-02 PROCEDURE — 99215 PR OFFICE/OUTPT VISIT, EST, LEVL V, 40-54 MIN: ICD-10-PCS | Mod: S$GLB,,, | Performed by: FAMILY MEDICINE

## 2023-10-02 PROCEDURE — 82962 POCT GLUCOSE, HAND-HELD DEVICE: ICD-10-PCS | Mod: S$GLB,,, | Performed by: FAMILY MEDICINE

## 2023-10-02 PROCEDURE — 85025 COMPLETE CBC W/AUTO DIFF WBC: CPT | Performed by: EMERGENCY MEDICINE

## 2023-10-02 PROCEDURE — 83735 ASSAY OF MAGNESIUM: CPT | Performed by: EMERGENCY MEDICINE

## 2023-10-02 PROCEDURE — 84100 ASSAY OF PHOSPHORUS: CPT | Performed by: PHYSICIAN ASSISTANT

## 2023-10-02 PROCEDURE — 63600175 PHARM REV CODE 636 W HCPCS: Performed by: PHYSICIAN ASSISTANT

## 2023-10-02 PROCEDURE — 82803 BLOOD GASES ANY COMBINATION: CPT

## 2023-10-02 PROCEDURE — 82962 GLUCOSE BLOOD TEST: CPT | Mod: S$GLB,,, | Performed by: FAMILY MEDICINE

## 2023-10-02 PROCEDURE — 83036 HEMOGLOBIN GLYCOSYLATED A1C: CPT | Performed by: PHYSICIAN ASSISTANT

## 2023-10-02 PROCEDURE — 81003 URINALYSIS AUTO W/O SCOPE: CPT | Mod: QW,S$GLB,, | Performed by: FAMILY MEDICINE

## 2023-10-02 PROCEDURE — 99285 EMERGENCY DEPT VISIT HI MDM: CPT | Mod: 25

## 2023-10-02 PROCEDURE — 83930 ASSAY OF BLOOD OSMOLALITY: CPT | Performed by: PHYSICIAN ASSISTANT

## 2023-10-02 PROCEDURE — 25000003 PHARM REV CODE 250: Performed by: PHYSICIAN ASSISTANT

## 2023-10-02 RX ORDER — GLUCAGON 1 MG
1 KIT INJECTION
Status: DISCONTINUED | OUTPATIENT
Start: 2023-10-02 | End: 2023-10-03 | Stop reason: HOSPADM

## 2023-10-02 RX ORDER — IBUPROFEN 200 MG
24 TABLET ORAL
Status: DISCONTINUED | OUTPATIENT
Start: 2023-10-02 | End: 2023-10-03 | Stop reason: HOSPADM

## 2023-10-02 RX ORDER — INSULIN ASPART 100 [IU]/ML
0-5 INJECTION, SOLUTION INTRAVENOUS; SUBCUTANEOUS
Status: DISCONTINUED | OUTPATIENT
Start: 2023-10-02 | End: 2023-10-03 | Stop reason: HOSPADM

## 2023-10-02 RX ORDER — IBUPROFEN 200 MG
16 TABLET ORAL
Status: DISCONTINUED | OUTPATIENT
Start: 2023-10-02 | End: 2023-10-03 | Stop reason: HOSPADM

## 2023-10-02 RX ORDER — ONDANSETRON 2 MG/ML
4 INJECTION INTRAMUSCULAR; INTRAVENOUS
Status: COMPLETED | OUTPATIENT
Start: 2023-10-02 | End: 2023-10-02

## 2023-10-02 RX ORDER — DIPHENHYDRAMINE HCL 25 MG
25 CAPSULE ORAL EVERY 6 HOURS PRN
Status: DISCONTINUED | OUTPATIENT
Start: 2023-10-02 | End: 2023-10-03 | Stop reason: HOSPADM

## 2023-10-02 RX ORDER — ONDANSETRON 4 MG/1
4 TABLET, ORALLY DISINTEGRATING ORAL EVERY 6 HOURS PRN
Status: DISCONTINUED | OUTPATIENT
Start: 2023-10-02 | End: 2023-10-03 | Stop reason: HOSPADM

## 2023-10-02 RX ORDER — CLOTRIMAZOLE AND BETAMETHASONE DIPROPIONATE 10; .64 MG/G; MG/G
CREAM TOPICAL 2 TIMES DAILY
COMMUNITY
End: 2023-10-02

## 2023-10-02 RX ORDER — ACETAMINOPHEN 325 MG/1
650 TABLET ORAL EVERY 6 HOURS PRN
Status: DISCONTINUED | OUTPATIENT
Start: 2023-10-02 | End: 2023-10-03 | Stop reason: HOSPADM

## 2023-10-02 RX ADMIN — INSULIN HUMAN 8 UNITS: 100 INJECTION, SOLUTION PARENTERAL at 05:10

## 2023-10-02 RX ADMIN — SODIUM CHLORIDE 1000 ML: 0.9 INJECTION, SOLUTION INTRAVENOUS at 04:10

## 2023-10-02 RX ADMIN — ONDANSETRON 4 MG: 2 INJECTION INTRAMUSCULAR; INTRAVENOUS at 05:10

## 2023-10-02 NOTE — PROGRESS NOTES
"Subjective:      Patient ID: Phil Leung is a 33 y.o. male.    Vitals:  height is 5' 8" (1.727 m) and weight is 120.2 kg (265 lb). His oral temperature is 98.3 °F (36.8 °C). His blood pressure is 140/95 (abnormal) and his pulse is 81. His respiration is 16 and oxygen saturation is 98%.     Chief Complaint: Urinary Frequency    Patient reports Frequent urination, increase thirst,  nausea, dry mouth, cramping and dizziness after starting on clotrimazole-betamethasone cream. Patient took pedialyte for his symptoms.    Urinary Frequency   This is a new problem. The current episode started in the past 7 days. The problem has been unchanged. The pain is at a severity of 0/10. There has been no fever. He is Not sexually active. There is No history of pyelonephritis. Associated symptoms include frequency and nausea. Associated symptoms comments: Dry mouth  Cramping  dizziness. Treatments tried: PEDIA lite. There is no history of diabetes mellitus.       Gastrointestinal:  Positive for nausea.   Genitourinary:  Positive for frequency.      Objective:     Vitals:    10/02/23 1252   BP: (!) 140/95   BP Location: Left arm   Patient Position: Sitting   BP Method: Medium (Automatic)   Pulse: 81   Resp: 16   Temp: 98.3 °F (36.8 °C)   TempSrc: Oral   SpO2: 98%   Weight: 120.2 kg (265 lb)   Height: 5' 8" (1.727 m)      Physical Exam   Constitutional: He is oriented to person, place, and time.  Non-toxic appearance. He does not appear ill. No distress.   HENT:   Head: Atraumatic.   Eyes: Conjunctivae are normal.   Neck: Neck supple.   Cardiovascular: Normal rate, regular rhythm, normal heart sounds and normal pulses.   Pulmonary/Chest: Effort normal and breath sounds normal.   Abdominal: Bowel sounds are normal. He exhibits distension (obese). Soft. There is no rebound.   Neurological: He is alert and oriented to person, place, and time.   Skin: Skin is not diaphoretic.   Psychiatric: Judgment and thought content normal. "       Results for orders placed or performed in visit on 10/02/23   POCT Urinalysis, Dipstick, Automated, W/O Scope   Result Value Ref Range    POC Blood, Urine Negative Negative    POC Bilirubin, Urine Negative Negative    POC Urobilinogen, Urine normal 0.3 - 2.2    POC Ketones, Urine Positive (A) Negative    POC Protein, Urine Negative Negative    POC Nitrates, Urine Negative Negative    POC Glucose, Urine Positive (A) Negative    pH, UA 5.0     POC Specific Gravity, Urine 1.010 1.003 - 1.029    POC Leukocytes, Urine Negative Negative   POCT Glucose, Hand-Held Device   Result Value Ref Range    POC Glucose 500 (A) 70 - 110 MG/DL      Assessment:     1. Frequency of urination    2. Polydipsia    3. Hyperglycemia    4. Ketonuria        Plan:     32 yo obese male with complaints of urinary frequency, polydipsia, dizziness. Glucose 500+, ketonuria. Hx of Balanitis. No kidney function on record. No PCP on record. Mother with history of thyroid disease. No family history of diabetes.    Frequency of urination  -     POCT Urinalysis, Dipstick, Automated, W/O Scope    2. Polydipsia  -     POCT Glucose, Hand-Held Device    3. Hyperglycemia  -     Refer to Emergency Dept: needs lab work up (kidney function) which determines dosage and suitability of medication to start in patient with diabetes.    4. Ketonuria  -     Refer to Emergency Dept.      Patient Instructions   This patient is deemed high risk and requires more complex work up which is not available in an urgent care setting. The  patient is being sent to the emergency room for further evaluation, treatment and disposition.

## 2023-10-02 NOTE — ED TRIAGE NOTES
Patient comes into the emergency department by POV with complaints of hyperglycemia. Patient states that checked blood sugar and noticed it was high 400s. Pt doesn't have hx of diabetes. Pt denies SOB, fever, jitteriness. Pt does say he feels dehydrated, drank pedia lite yesterday.

## 2023-10-02 NOTE — HPI
Reason for Consult: Management of T2DM, Hyperglycemia     Surgical Procedure and Date: none    Diabetes diagnosis year: 10/2023    Home Diabetes Medications:  none- new onset  Lab Results   Component Value Date    HGBA1C 11.5 (H) 10/02/2023       Diabetes Complications include:     Hyperglycemia     Complicating diabetes co morbidities:   Obesity       HPI:   Patient is a 33 y.o. male with a diagnosis of obesity presenting to the ED with the chief complaint of hyperglycemia. He reports symptoms of polyuria, dry mouth, polydipsia for the past week. He went to urgent care today and instructed to come to the ED after glucose was >500 and UA had +ketones. Denies personal or family history of DM. Reports last being evaluated for DM was 3 years ago. Denies daily medications. No ETOH, recreational drug, or tobacco use. No fever, chest pain, SOB, cough, abdominal pain, dysuria, constipation, diarrhea.     In the ED, labs showed hyperglycemia 465, Crt 1.2, AG 12, VBG pH slightly decreased 7.34, B-HB elevated 3.2. CXR without acute processes. POCT UA at  with +ketones. Received 1L NS, Zofran 4mg IV, Insulin 8U IV.  Endocrinology consulted for management of new onset T2DM.

## 2023-10-02 NOTE — H&P
"ED Observation Unit  History and Physical      I assumed care of this patient from the Main ED at onset of observation time, 5:18 PM on 10/02/2023.     History of Present Illness:    Phil Leung is a 33 y.o. male with medical history of obesity presenting to the ED with the chief complaint of hyperglycemia.    Reports polyuria, dry mouth, polydipsia for the past week. Reports 1 episode of vomiting while brushing his teeth this morning. He went to urgent care today and instructed to come to the ED after glucose was >500 and UA had +ketones. Denies personal or family history of DM. Reports last being evaluated for DM was 3 years ago. Denies daily medications. No ETOH, recreational drug, or tobacco use. No fever, chest pain, SOB, cough, abdominal pain, dysuria, constipation, diarrhea.    In the ED, labs showed hyperglycemia 465, Crt 1.2, AG 12, VBG pH slightly decreased 7.34, B-HB eleevated 3.2. CXR without acute processes. POCT UA at  with +ketones. Received 1L NS, Zofran 4mg IV, Insulin 8U IV.     I reviewed the ED Provider Note dated 10/2/2023 prior to my evaluation of this patient.  I reviewed all labs and imaging performed in the Main ED, prior to patient being placed in Observation. Patient was placed in the ED Observation Unit for Hyperglycemia.    PMHx   History reviewed. No pertinent past medical history.   History reviewed. No pertinent surgical history.     Family Hx   Family History   Problem Relation Age of Onset    No Known Problems Mother     Hypertension Father         Social Hx   Social History     Socioeconomic History    Marital status: Single   Tobacco Use    Smoking status: Never     Passive exposure: Never    Smokeless tobacco: Never        Vital Signs   Vitals:    10/02/23 1437   BP: (!) 154/107   Pulse: 83   Resp: 18   Temp: 98.3 °F (36.8 °C)   TempSrc: Oral   SpO2: 97%   Weight: 120.2 kg (265 lb)   Height: 5' 8" (1.727 m)        Review of Systems  Review of Systems   Genitourinary:  " Positive for frequency.   Endo/Heme/Allergies:  Positive for polydipsia.       Physical Exam  Physical Exam    Medications:   Scheduled Meds:      Continuous Infusions:  PRN Meds:.    Assessment/Plan:  Hyperglycemia  -Presenting with polyuria and polydipsia for about a week. No history of DM  -Labs with +B-HB and urine ketones, but anion gap normal, bicarb normal, VBG pH 7.342. Lower suspicion for DKA.  -Checking A1c and serum osmol  -Received 1L NS and Insulin 8units IV in the ED  -Sliding scale insulin  -Endocrinology consult for new onset DM recommendations    Case was discussed with the ED provider, Lucero Mckeon PA-C.

## 2023-10-02 NOTE — FIRST PROVIDER EVALUATION
"Medical screening examination initiated.  I have conducted a focused provider triage encounter, findings are as follows:    Brief history of present illness:  recently diagnosed with DM, polyuria and polydipsia, Glucose HIGH at     Vitals:    10/02/23 1437   BP: (!) 154/107   Pulse: 83   Resp: 18   Temp: 98.3 °F (36.8 °C)   TempSrc: Oral   SpO2: 97%   Weight: 120.2 kg (265 lb)   Height: 5' 8" (1.727 m)       Pertinent physical exam:  No acute distress or altered mental status.     Brief workup plan:  DKA labs    Preliminary workup initiated; this workup will be continued and followed by the physician or advanced practice provider that is assigned to the patient when roomed.  "

## 2023-10-02 NOTE — PATIENT INSTRUCTIONS
This patient is deemed high risk and requires more complex work up which is not available in an urgent care setting. The  patient is being sent to the emergency room for further evaluation, treatment and disposition.

## 2023-10-02 NOTE — ED PROVIDER NOTES
Encounter Date: 10/2/2023       History     Chief Complaint   Patient presents with    Hyperglycemia     Sent from   [facility] for machine, new onset,     33-year-old male with no significant past medical history presents from urgent care clinic for hyperglycemia.  He went to urgent care today due to polyuria and polydipsia for about a week.  He was noted to be hyperglycemic and referred to the ED for further management.  He reports occasional nausea, a few episodes of vomiting and shortness of breath and some fatigue.  He denies chest pain, abdominal pain, dysuria, hematuria or polyphagia.  Has not had labs checked in a long period of time.  No recent steroid use.  He denies any family history of diabetes.    Review of patient's allergies indicates:  No Known Allergies  History reviewed. No pertinent past medical history.  History reviewed. No pertinent surgical history.  Family History   Problem Relation Age of Onset    No Known Problems Mother     Hypertension Father      Social History     Tobacco Use    Smoking status: Never     Passive exposure: Never    Smokeless tobacco: Never     Review of Systems    Physical Exam     Initial Vitals [10/02/23 1437]   BP Pulse Resp Temp SpO2   (!) 154/107 83 18 98.3 °F (36.8 °C) 97 %      MAP       --         Physical Exam    Nursing note and vitals reviewed.  Constitutional: He appears well-developed and well-nourished. He is not diaphoretic. No distress.   HENT:   Head: Normocephalic and atraumatic.   Dry mucous membranes +thrush   Neck: Neck supple.   Normal range of motion.  Cardiovascular:  Normal rate, regular rhythm, normal heart sounds and intact distal pulses.     Exam reveals no gallop and no friction rub.       No murmur heard.  Pulmonary/Chest: Breath sounds normal. No respiratory distress. He has no wheezes. He has no rhonchi. He has no rales. He exhibits no tenderness.   Abdominal: Abdomen is soft. Bowel sounds are normal. He exhibits no distension and no  mass. There is no abdominal tenderness. There is no rebound and no guarding.   Musculoskeletal:         General: Normal range of motion.      Cervical back: Normal range of motion and neck supple.     Neurological: He is alert.   Skin: Skin is warm and dry.   Acanthosis nigricans on the posterior neck   Psychiatric: He has a normal mood and affect.       ED Course   Procedures  Labs Reviewed   CBC W/ AUTO DIFFERENTIAL - Abnormal; Notable for the following components:       Result Value    MCV 81 (*)     All other components within normal limits    Narrative:     Release to patient->Immediate   COMPREHENSIVE METABOLIC PANEL - Abnormal; Notable for the following components:    Glucose 465 (*)     Alkaline Phosphatase 152 (*)     AST 47 (*)     ALT 55 (*)     All other components within normal limits    Narrative:     Release to patient->Immediate   BETA - HYDROXYBUTYRATE, SERUM - Abnormal; Notable for the following components:    Beta-Hydroxybutyrate 3.2 (*)     All other components within normal limits    Narrative:     Release to patient->Immediate   POCT GLUCOSE - Abnormal; Notable for the following components:    POCT Glucose 419 (*)     All other components within normal limits   ISTAT PROCEDURE - Abnormal; Notable for the following components:    POC PH 7.342 (*)     POC PO2 39 (*)     POC HCO3 23.1 (*)     All other components within normal limits   MAGNESIUM    Narrative:     Release to patient->Immediate   HIV 1 / 2 ANTIBODY    Narrative:     Release to patient->Immediate   HEPATITIS C ANTIBODY    Narrative:     Release to patient->Immediate   POCT GLUCOSE MONITORING CONTINUOUS          Imaging Results              X-Ray Chest PA And Lateral (Final result)  Result time 10/02/23 16:41:44      Final result by Mushtaq Webb MD (10/02/23 16:41:44)                   Impression:      No acute process.      Electronically signed by: Mushtaq Webb MD  Date:    10/02/2023  Time:    16:41               Narrative:     EXAMINATION:  XR CHEST PA AND LATERAL    CLINICAL HISTORY:  Shortness of breath    TECHNIQUE:  PA and lateral views of the chest were performed.    COMPARISON:  None    FINDINGS:  The trachea is unremarkable.  The cardiomediastinal silhouette is within normal limits.  The hilar structures are unremarkable.  There is no evidence of free air beneath the hemidiaphragms.  There are no pleural effusions.  There is no evidence of a pneumothorax.  There is no evidence of pneumomediastinum.  No airspace opacity is present.  The osseous structures are unremarkable.  The subcutaneous tissues are unremarkable.                                       Medications   sodium chloride 0.9% bolus 1,000 mL 1,000 mL (1,000 mLs Intravenous New Bag 10/2/23 1630)   ondansetron injection 4 mg (0 mg Intravenous Hold 10/2/23 1615)   insulin regular injection 8 Units 0.08 mL (has no administration in time range)     Medical Decision Making  33-year-old male presenting from urgent care for hyperglycemia with polyuria and polydipsia.  He is hypertensive 154/107 with otherwise normal vitals.  Nontoxic appearing.    Differential diagnosis:  New onset diabetes with hyperglycemia  Dehydration  Electrolyte derangement  Renal dysfunction  Lower clinical suspicion for DKA    Will check labs, give fluid bolus and reassess.    Labs notable for hyperglycemia.  Beta hydroxybutyrate is mildly elevated but not acidotic.    I discussed this patient with MANNY Riddle.  Will admit to ED observation unit for further management.  Plan for observation:    Blood glucose monitoring  IV hydration  I do not think that he requires insulin drip or further IV pushes at this point but if glucose not significantly improve may need additional insulin  Sliding-scale   Endocrine consult    Patient comfortable with admission.  I discussed this patient with my supervising physician.    Amount and/or Complexity of Data Reviewed  External Data Reviewed: labs.     Details:  Patient seen urgent care earlier today with a glucose of 500.  Of note, he was seen prior to that for balanitis and did have glucose in his urine doubt ketones   Labs: ordered. Decision-making details documented in ED Course.  Radiology: ordered and independent interpretation performed.    Risk  OTC drugs.  Prescription drug management.  Drug therapy requiring intensive monitoring for toxicity.  Decision regarding hospitalization.              Attending Attestation:     Physician Attestation Statement for NP/PA:   I have directed and reviewed the workup performed by the PA/NP.  I performed the substantive portion of the medical decision making.     Other NP/PA Attestation Additions:    History of Present Illness: This is a 33-year-old male who presents to the emergency department today stating that he has felt poorly for about a week now.  He is felt very fatigued and has noticed that he has had polydipsia and polyuria.  He went to the urgent care clinic and had his blood glucose drawn there and it returned critically high and thus he was referred to the emergency department for further care.  He is also had some nausea and vomiting.  No abdominal pain and no diarrhea.  He has had no michele chest pain.  No fevers.  No recent illnesses otherwise that he can recall.   Physical Exam: VS upon arrival 154/107; 83; 18; 97% room air; 98.3° F oral.  Consititutional: Pt is awake, alert, and oriented x 4. Does not appear to be in any michele distress.  HEENT: PERRL; EOMI; nares patent; op dry and does have some white plaques to tongue (thrush).  Neck: Supple with good ROM.  CV: Normal rate; regular rhythm; no mrg. Heart sounds normal. No peripheral edema. 2+ radials bilateral and symmetric.  Respiratory: CTA bilaterally with no focal rales, ronchi, or wheezes.  GI: Abdomen soft, NTND. No rebound. No guarding. BS normal. Benign abdominal examination.   MSK: No long bone deformities; no focal joint swellings.  Neuro: MAEW.   Skin:  No skin lesions or rash.       Medical Decision Making: This patient's presentation was very concerning for new diagnosis of diabetes.  He had elevated blood glucose at the urgent care clinic.  He is had symptoms of hyperglycemia.  Our differential included needing to rule out diabetic ketoacidosis.  We obtain laboratory studies in began this workup.  His white count was normal.  His blood glucose was 465.  He had no anion gap.  His pH was 7.34 to with a pCO2 of 42.6 and his hepatic panel showed mild transaminitis.  His urinalysis revealed glucosuria and ketonuria.  We began treating the patient with intravenous fluids as well as sliding scale insulin.  I did not feel that the patient required insulin drip at this time as he did not have an anion gap and his pH was at 7.34 which was only mildly depressed.  However, I did feel that this patient would require admission as this was a new diagnosis.  He likely would need further investigation into what triggered this or if this has been simply slowly developing over time.  Hemoglobin A1c will be necessary.  And then we will also need an endocrine consult to determine how we are going to treat the patient's glucose from here.  We have discussed this case with the OBS 1 AP P and we are going to admit the patient to the observation unit.    ED Diagnosis:  1. Acute hyperglycemia.   2. Acute dehydration, requiring intravenous fluid hydration.   3. Acute transaminitis.   *I performed the substantive portion of the MDM.*           ED Course as of 10/02/23 1716   Mon Oct 02, 2023   1600 POC PH(!): 7.342  Not acidotic [CC]   1600 Beta-Hydroxybutyrate(!): 3.2 [CC]   1602 WBC: 8.66  No leukocytosis [CC]   1621 Glucose(!!): 465 [CC]   1621 Creatinine: 1.2 [CC]   1621 CO2: 25 [CC]   1621 Potassium: 3.9 [CC]      ED Course User Index  [CC] Lucero Mckeon PA-C                    Clinical Impression:   Final diagnoses:  [R06.02] Shortness of breath  [R73.9] Hyperglycemia  (Primary)  [E11.9] Diabetes mellitus, new onset        ED Disposition Condition    Observation Stable                  Lucero Mckeon PA-C  10/02/23 1716       Caryn Nguyen MD  10/04/23 0767

## 2023-10-03 VITALS
BODY MASS INDEX: 40.16 KG/M2 | TEMPERATURE: 98 F | RESPIRATION RATE: 18 BRPM | OXYGEN SATURATION: 99 % | HEART RATE: 73 BPM | DIASTOLIC BLOOD PRESSURE: 92 MMHG | WEIGHT: 265 LBS | HEIGHT: 68 IN | SYSTOLIC BLOOD PRESSURE: 133 MMHG

## 2023-10-03 PROBLEM — E66.813 CLASS 3 SEVERE OBESITY DUE TO EXCESS CALORIES WITH SERIOUS COMORBIDITY AND BODY MASS INDEX (BMI) OF 40.0 TO 44.9 IN ADULT: Status: ACTIVE | Noted: 2023-10-03

## 2023-10-03 PROBLEM — E11.65 TYPE 2 DIABETES MELLITUS WITH HYPERGLYCEMIA: Status: ACTIVE | Noted: 2023-10-03

## 2023-10-03 PROBLEM — E11.9 DIABETES MELLITUS, NEW ONSET: Status: ACTIVE | Noted: 2023-10-03

## 2023-10-03 PROBLEM — E66.01 MORBID (SEVERE) OBESITY DUE TO EXCESS CALORIES: Status: ACTIVE | Noted: 2023-10-03

## 2023-10-03 LAB
ANION GAP SERPL CALC-SCNC: 10 MMOL/L (ref 8–16)
BASOPHILS # BLD AUTO: 0.03 K/UL (ref 0–0.2)
BASOPHILS NFR BLD: 0.4 % (ref 0–1.9)
BUN SERPL-MCNC: 10 MG/DL (ref 6–20)
CALCIUM SERPL-MCNC: 8.2 MG/DL (ref 8.7–10.5)
CHLORIDE SERPL-SCNC: 104 MMOL/L (ref 95–110)
CO2 SERPL-SCNC: 21 MMOL/L (ref 23–29)
CREAT SERPL-MCNC: 1.1 MG/DL (ref 0.5–1.4)
DIFFERENTIAL METHOD: ABNORMAL
EOSINOPHIL # BLD AUTO: 0.2 K/UL (ref 0–0.5)
EOSINOPHIL NFR BLD: 3.2 % (ref 0–8)
ERYTHROCYTE [DISTWIDTH] IN BLOOD BY AUTOMATED COUNT: 12.2 % (ref 11.5–14.5)
EST. GFR  (NO RACE VARIABLE): >60 ML/MIN/1.73 M^2
GLUCOSE SERPL-MCNC: 332 MG/DL (ref 70–110)
HCT VFR BLD AUTO: 40.3 % (ref 40–54)
HGB BLD-MCNC: 13.1 G/DL (ref 14–18)
IMM GRANULOCYTES # BLD AUTO: 0.04 K/UL (ref 0–0.04)
IMM GRANULOCYTES NFR BLD AUTO: 0.5 % (ref 0–0.5)
LYMPHOCYTES # BLD AUTO: 2.7 K/UL (ref 1–4.8)
LYMPHOCYTES NFR BLD: 36.1 % (ref 18–48)
MCH RBC QN AUTO: 26.5 PG (ref 27–31)
MCHC RBC AUTO-ENTMCNC: 32.5 G/DL (ref 32–36)
MCV RBC AUTO: 81 FL (ref 82–98)
MONOCYTES # BLD AUTO: 0.7 K/UL (ref 0.3–1)
MONOCYTES NFR BLD: 9.2 % (ref 4–15)
NEUTROPHILS # BLD AUTO: 3.8 K/UL (ref 1.8–7.7)
NEUTROPHILS NFR BLD: 50.6 % (ref 38–73)
NRBC BLD-RTO: 0 /100 WBC
PLATELET # BLD AUTO: 186 K/UL (ref 150–450)
PMV BLD AUTO: 11.5 FL (ref 9.2–12.9)
POCT GLUCOSE: 296 MG/DL (ref 70–110)
POCT GLUCOSE: 307 MG/DL (ref 70–110)
POCT GLUCOSE: 313 MG/DL (ref 70–110)
POCT GLUCOSE: 317 MG/DL (ref 70–110)
POCT GLUCOSE: 319 MG/DL (ref 70–110)
POCT GLUCOSE: 360 MG/DL (ref 70–110)
POCT GLUCOSE: 395 MG/DL (ref 70–110)
POTASSIUM SERPL-SCNC: 4.3 MMOL/L (ref 3.5–5.1)
RBC # BLD AUTO: 4.95 M/UL (ref 4.6–6.2)
SODIUM SERPL-SCNC: 135 MMOL/L (ref 136–145)
WBC # BLD AUTO: 7.42 K/UL (ref 3.9–12.7)

## 2023-10-03 PROCEDURE — G0378 HOSPITAL OBSERVATION PER HR: HCPCS

## 2023-10-03 PROCEDURE — 99214 PR OFFICE/OUTPT VISIT, EST, LEVL IV, 30-39 MIN: ICD-10-PCS | Mod: ,,, | Performed by: NURSE PRACTITIONER

## 2023-10-03 PROCEDURE — 96361 HYDRATE IV INFUSION ADD-ON: CPT

## 2023-10-03 PROCEDURE — 63600175 PHARM REV CODE 636 W HCPCS: Performed by: PHYSICIAN ASSISTANT

## 2023-10-03 PROCEDURE — 99214 OFFICE O/P EST MOD 30 MIN: CPT | Mod: ,,, | Performed by: NURSE PRACTITIONER

## 2023-10-03 PROCEDURE — 82962 GLUCOSE BLOOD TEST: CPT | Mod: 91

## 2023-10-03 PROCEDURE — 63600175 PHARM REV CODE 636 W HCPCS: Performed by: NURSE PRACTITIONER

## 2023-10-03 PROCEDURE — 96372 THER/PROPH/DIAG INJ SC/IM: CPT | Performed by: PHYSICIAN ASSISTANT

## 2023-10-03 PROCEDURE — 25000003 PHARM REV CODE 250: Performed by: NURSE PRACTITIONER

## 2023-10-03 PROCEDURE — 80048 BASIC METABOLIC PNL TOTAL CA: CPT | Performed by: PHYSICIAN ASSISTANT

## 2023-10-03 PROCEDURE — 85025 COMPLETE CBC W/AUTO DIFF WBC: CPT | Performed by: PHYSICIAN ASSISTANT

## 2023-10-03 PROCEDURE — 96372 THER/PROPH/DIAG INJ SC/IM: CPT | Performed by: NURSE PRACTITIONER

## 2023-10-03 PROCEDURE — 25000003 PHARM REV CODE 250: Performed by: PHYSICIAN ASSISTANT

## 2023-10-03 RX ORDER — PEN NEEDLE, DIABETIC 30 GX3/16"
NEEDLE, DISPOSABLE MISCELLANEOUS
Qty: 100 EACH | Refills: 15 | Status: SHIPPED | OUTPATIENT
Start: 2023-10-03

## 2023-10-03 RX ORDER — LANCETS
EACH MISCELLANEOUS
Qty: 100 EACH | Refills: 3 | Status: SHIPPED | OUTPATIENT
Start: 2023-10-03 | End: 2023-10-03 | Stop reason: SDUPTHER

## 2023-10-03 RX ORDER — INSULIN GLARGINE 100 [IU]/ML
18 INJECTION, SOLUTION SUBCUTANEOUS DAILY
Qty: 6 ML | Refills: 11 | Status: SHIPPED | OUTPATIENT
Start: 2023-10-03 | End: 2024-10-02

## 2023-10-03 RX ORDER — INSULIN ASPART 100 [IU]/ML
8 INJECTION, SOLUTION INTRAVENOUS; SUBCUTANEOUS
Qty: 7.2 ML | Refills: 11 | Status: SHIPPED | OUTPATIENT
Start: 2023-10-03 | End: 2023-10-03 | Stop reason: SDUPTHER

## 2023-10-03 RX ORDER — METFORMIN HYDROCHLORIDE 500 MG/1
1000 TABLET, EXTENDED RELEASE ORAL 2 TIMES DAILY WITH MEALS
Qty: 360 TABLET | Refills: 3 | Status: SHIPPED | OUTPATIENT
Start: 2023-10-03 | End: 2023-10-06 | Stop reason: SDUPTHER

## 2023-10-03 RX ORDER — SODIUM CHLORIDE 9 MG/ML
1000 INJECTION, SOLUTION INTRAVENOUS
Status: COMPLETED | OUTPATIENT
Start: 2023-10-03 | End: 2023-10-03

## 2023-10-03 RX ORDER — INSULIN ASPART 100 [IU]/ML
6 INJECTION, SOLUTION INTRAVENOUS; SUBCUTANEOUS
Status: DISCONTINUED | OUTPATIENT
Start: 2023-10-03 | End: 2023-10-03

## 2023-10-03 RX ORDER — METFORMIN HYDROCHLORIDE 500 MG/1
1000 TABLET, EXTENDED RELEASE ORAL 2 TIMES DAILY WITH MEALS
Qty: 360 TABLET | Refills: 3 | Status: SHIPPED | OUTPATIENT
Start: 2023-10-03 | End: 2023-10-03 | Stop reason: SDUPTHER

## 2023-10-03 RX ORDER — INSULIN ASPART 100 [IU]/ML
8 INJECTION, SOLUTION INTRAVENOUS; SUBCUTANEOUS
Status: DISCONTINUED | OUTPATIENT
Start: 2023-10-03 | End: 2023-10-03 | Stop reason: HOSPADM

## 2023-10-03 RX ORDER — PEN NEEDLE, DIABETIC 30 GX3/16"
NEEDLE, DISPOSABLE MISCELLANEOUS
Qty: 100 EACH | Refills: 15 | Status: SHIPPED | OUTPATIENT
Start: 2023-10-03 | End: 2023-10-03 | Stop reason: SDUPTHER

## 2023-10-03 RX ORDER — INSULIN LISPRO 100 [IU]/ML
8 INJECTION, SOLUTION INTRAVENOUS; SUBCUTANEOUS
Qty: 9 ML | Refills: 11 | Status: SHIPPED | OUTPATIENT
Start: 2023-10-03 | End: 2024-10-02

## 2023-10-03 RX ORDER — BLOOD-GLUCOSE CONTROL, NORMAL
EACH MISCELLANEOUS
Qty: 100 EACH | Refills: 3 | Status: SHIPPED | OUTPATIENT
Start: 2023-10-03

## 2023-10-03 RX ORDER — INSULIN PUMP SYRINGE, 3 ML
EACH MISCELLANEOUS
Qty: 1 EACH | Refills: 0 | Status: SHIPPED | OUTPATIENT
Start: 2023-10-03 | End: 2023-10-03 | Stop reason: SDUPTHER

## 2023-10-03 RX ORDER — INSULIN GLARGINE 100 [IU]/ML
18 INJECTION, SOLUTION SUBCUTANEOUS DAILY
Qty: 5.4 ML | Refills: 11 | Status: SHIPPED | OUTPATIENT
Start: 2023-10-03 | End: 2023-10-03 | Stop reason: SDUPTHER

## 2023-10-03 RX ORDER — DEXTROSE 4 G
TABLET,CHEWABLE ORAL
Qty: 1 EACH | Refills: 0 | Status: SHIPPED | OUTPATIENT
Start: 2023-10-03

## 2023-10-03 RX ADMIN — INSULIN DETEMIR 18 UNITS: 100 INJECTION, SOLUTION SUBCUTANEOUS at 09:10

## 2023-10-03 RX ADMIN — INSULIN ASPART 6 UNITS: 100 INJECTION, SOLUTION INTRAVENOUS; SUBCUTANEOUS at 12:10

## 2023-10-03 RX ADMIN — SODIUM CHLORIDE 1000 ML: 9 INJECTION, SOLUTION INTRAVENOUS at 12:10

## 2023-10-03 RX ADMIN — INSULIN ASPART 6 UNITS: 100 INJECTION, SOLUTION INTRAVENOUS; SUBCUTANEOUS at 07:10

## 2023-10-03 RX ADMIN — INSULIN ASPART 3 UNITS: 100 INJECTION, SOLUTION INTRAVENOUS; SUBCUTANEOUS at 12:10

## 2023-10-03 NOTE — ED NOTES
Patient identifiers for Phil HAMILTON [place] checked and correct.    LOC: The patient is awake, alert and aware of environment with an appropriate affect, the patient is oriented x 4 and speaking appropriately.    APPEARANCE: Patient resting comfortably and in no acute distress, patient is clean and well groomed, patient's clothing is properly fastened.    SKIN: The skin is warm and dry, color consistent with ethnicity, patient has normal skin turgor and moist mucus membranes, skin intact, no breakdown or bruising noted.    MUSCULOSKELETAL: Patient moving all extremities well, no obvious swelling or deformities noted.    RESPIRATORY: Airway is open and patent, respirations are spontaneous and even, patient has a normal effort and rate.    CARDIAC: Patient has a normal rate .    ABDOMEN: Soft and non tender to palpation, no distention noted. Patient denies any nausea, vomiting, diarrhea, or constipation.     NEUROLOGIC: PERRL    HEENT: No abnormalities noted. White sclera and pupils equal round and reactive to light. Denies headache, dizziness.     : Pt voids independently, denies dysuria, hematuria, frequency.

## 2023-10-03 NOTE — ASSESSMENT & PLAN NOTE
New onset DM  BG goal 140-180.     0.3 u/kg wt based  Levemir 18 units daily.(0.3 u/kg dosing)   Increase Novolog to 8 units with meals.   BG monitoring ac/hs and low dose correction scale.     Discharge plans:  Given a1c of 11.5 would recommend MDI regimen at discharge.     -Start Lantus 18 units once daily  -Start Humalog 8 units TID with meals  -Start Humalog Correction Scale prn ac/hs   150 - 200 + 1 unit  201 - 250 + 2 units  251 - 300 + 3 units  301 - 350 + 4 units   > 350   + 5 units    -Start Metformin  mg pills:  1 pill once daily with breakfast or a week THEN  1 pill with breakfast, 1 pill with dinner for a week THEN  1 pill with breakfast, 2 pills with dinner for a week THEN  2 pills with breakfast, 2 pills with dinner     -Glucometer and testing supplies (insurance preferred) and insulin pen needles     -Will schedule for outpatient DM education at [facility] endocrine clinic and follow up at [facility] endocrine clinic. Patient to submit BG logs to patient portal message in 3-4 days for review. Instructed to notify [facility] endocrine clinic for any BG < 80 or consistently > 200.     Discharge Teaching:    Reviewed topics related to DM including: the need for insulin, how insulin works, what makes it a high risk medication, the importance of immediate follow up with either PCP or endocrine provider, importance of and how to check BG, how to record BG on logs, how to administer insulin, appropriate insulin administration sites, importance of rotating injection sites, hyper/hypoglycemia, how and when to treat hypoglycemia, when to hold insulin, how the correction scale works, importance of storing unused insulin in the refrigerator, and when to seek medical attention.  Patient verbalized understanding, answered all questions to patient's satisfaction.

## 2023-10-03 NOTE — PROGRESS NOTES
[facility] Note    Chief Complaint      Chief Complaint   Patient presents with    Hospital Follow Up    Diabetes       History of Present Illness      Phil Leung is a 33 y.o. male who presents today for   Chief Complaint   Patient presents with    Hospital Follow Up    Diabetes         Patient presents to clinic today for establishment of primary care with me, and follow visit after ER visit for diabetes insipidus on 10/05/2023.  He has an appointment with endocrinology today 10/02/2023. He has no significant past medical history. He presented from urgent care clinic to ER for hyperglycemia.  He went to urgent care due to polyuria and polydipsia for about a week.  He was noted to be hyperglycemic and referred to the ED for further management.          Review of Systems   All 12 systems otherwise negative.       Family History:  family history includes Hypertension in his father; No Known Problems in his brother, brother, brother, maternal grandfather, maternal grandmother, paternal grandfather, paternal grandmother, sister, sister, and sister; Polycystic ovary syndrome in his sister; Thyroid disease in his mother.   Family history was reviewed with patient.     Medications:  Outpatient Encounter Medications as of 10/6/2023   Medication Sig Dispense Refill    blood sugar diagnostic Strp To check BG 4  times daily, to use with insurance preferred meter 100 each 3    blood-glucose meter Misc To check BG 4 times daily, to use with insurance preferred meter 1 each 0    insulin (LANTUS SOLOSTAR U-100 INSULIN) glargine 100 units/mL SubQ pen Inject 18 Units into the skin once daily. 6 mL 11    insulin lispro 100 unit/mL pen Inject 8 Units into the skin 3 (three) times daily with meals. 9 mL 11    lancets 30 gauge Misc To check BG 4 times daily, to use with insurance preferred meter 100 each 3    metFORMIN (GLUCOPHAGE-XR) 500 MG ER 24hr tablet Take 2 tablets (1,000 mg total) by mouth 2 (two) times  "daily with meals. 360 tablet 3    pen needle, diabetic (BD ULTRA-FINE BAUTISTA PEN NEEDLE) 32 gauge x 5/32" Ndle To use with insulin injections 4x daily 100 each 15    [DISCONTINUED] blood sugar diagnostic Strp To check BG 4  times daily, to use with insurance preferred meter 100 each 3    [DISCONTINUED] blood-glucose meter kit To check BG 4 times daily, to use with insurance preferred meter 1 each 0    [DISCONTINUED] insulin (LANTUS SOLOSTAR U-100 INSULIN) glargine 100 units/mL SubQ pen Inject 18 Units into the skin once daily. 5.4 mL 11    [DISCONTINUED] insulin aspart U-100 (NOVOLOG FLEXPEN U-100 INSULIN) 100 unit/mL (3 mL) InPn pen Inject 8 Units into the skin 3 (three) times daily with meals. 7.2 mL 11    [DISCONTINUED] lancets Misc To check BG  4  times daily, to use with insurance preferred meter 100 each 3    [DISCONTINUED] metFORMIN (GLUCOPHAGE-XR) 500 MG ER 24hr tablet Take 2 tablets (1,000 mg total) by mouth 2 (two) times daily with meals. 360 tablet 3    [DISCONTINUED] pen needle, diabetic (BD ULTRA-FINE BAUTISTA PEN NEEDLE) 32 gauge x 5/32" Ndle To use with insulin injections 4x daily 100 each 15    [] 0.9%  NaCl infusion       [DISCONTINUED] acetaminophen tablet 650 mg       [DISCONTINUED] dextrose 10% bolus 125 mL 125 mL       [DISCONTINUED] dextrose 10% bolus 250 mL 250 mL       [DISCONTINUED] diphenhydrAMINE capsule 25 mg       [DISCONTINUED] glucagon (human recombinant) injection 1 mg       [DISCONTINUED] glucose chewable tablet 16 g       [DISCONTINUED] glucose chewable tablet 24 g       [DISCONTINUED] insulin aspart U-100 pen 0-5 Units       [DISCONTINUED] insulin aspart U-100 pen 6 Units       [DISCONTINUED] insulin detemir U-100 (Levemir) pen 18 Units       [DISCONTINUED] ondansetron disintegrating tablet 4 mg        No facility-administered encounter medications on file as of 10/6/2023.       Allergies:  Review of patient's allergies indicates:  No Known Allergies    Health Maintenance:  Health " "Maintenance   Topic Date Due    Lipid Panel  Never done    Foot Exam  Never done    Eye Exam  10/13/2023 (Originally 5/6/2000)    TETANUS VACCINE  10/06/2024 (Originally 5/6/2008)    Hemoglobin A1c  01/02/2024    Hepatitis C Screening  Completed     Health Maintenance Topics with due status: Not Due       Topic Last Completion Date    Hemoglobin A1c 10/02/2023       Physical Exam      Vital Signs  Temp: 98.1 °F (36.7 °C)  Pulse: 85  SpO2: 96 %  BP: 126/86  Pain Score: 0-No pain  Height and Weight  Height: 5' 8" (172.7 cm)  Weight: 121 kg (266 lb 12.8 oz)  BSA (Calculated - sq m): 2.41 sq meters  BMI (Calculated): 40.6  Weight in (lb) to have BMI = 25: 164.1]    Physical Exam  Vitals reviewed.   Constitutional:       Appearance: Normal appearance. He is obese.   HENT:      Head: Normocephalic and atraumatic.      Nose: Nose normal.      Mouth/Throat:      Mouth: Mucous membranes are moist.      Pharynx: Oropharynx is clear.   Eyes:      Extraocular Movements: Extraocular movements intact.      Conjunctiva/sclera: Conjunctivae normal.      Pupils: Pupils are equal, round, and reactive to light.   Cardiovascular:      Rate and Rhythm: Normal rate and regular rhythm.      Pulses: Normal pulses.      Heart sounds: Normal heart sounds.   Pulmonary:      Effort: Pulmonary effort is normal.      Breath sounds: Normal breath sounds.   Musculoskeletal:         General: Normal range of motion.      Cervical back: Normal range of motion and neck supple.   Skin:     General: Skin is warm and dry.      Capillary Refill: Capillary refill takes less than 2 seconds.   Neurological:      General: No focal deficit present.      Mental Status: He is alert and oriented to person, place, and time. Mental status is at baseline.   Psychiatric:         Mood and Affect: Mood normal.         Behavior: Behavior normal.         Thought Content: Thought content normal.         Judgment: Judgment normal.            Assessment/Plan     Phil HAMILTON" Xochitl is a 33 y.o.male with:    Routine medical exam  -     T4, Free; Future; Expected date: 10/06/2023  -     TSH; Future; Expected date: 10/06/2023  -     Lipid Panel; Future; Expected date: 10/06/2023    Type 2 diabetes mellitus without complication, without long-term current use of insulin  -     Ambulatory referral/consult to Podiatry; Future; Expected date: 10/13/2023  -     Ambulatory referral/consult to Optometry; Future; Expected date: 10/13/2023    Class 3 severe obesity due to excess calories with serious comorbidity and body mass index (BMI) of 40.0 to 44.9 in adult    I discussed diet of low calorie, low fat, low cholesterol, low carbohydrate, no fast foods diet. I have advised walking for 30 minutes for 2 days a week for 2 weeks and increasing a 30 minute day every 2 weeks. Patient verbalizes understanding.     As above, continue current medications and maintain follow up with specialists.  Return to clinic as needed.    Greater than 50% of visit was spent face to face with patient.  All questions were answered to patient's satisfaction.          Karen L Spencer, NP-C [facility]

## 2023-10-03 NOTE — DISCHARGE SUMMARY
I have reviewed the note below by Kristy Campuzano and agree. Endocrine consult has occurred and patient has plan for treatment of his newly diagnosed diabetes. Cecile Nguyen MD    ED Observation Unit  Discharge Summary        History of Present Illness:    Phil Leung is a 33 y.o. male with medical history of obesity presenting to the ED with the chief complaint of hyperglycemia.     Reports polyuria, dry mouth, polydipsia for the past week. Reports 1 episode of vomiting while brushing his teeth this morning. He went to urgent care today and instructed to come to the ED after glucose was >500 and UA had +ketones. Denies personal or family history of DM. Reports last being evaluated for DM was 3 years ago. Denies daily medications. No ETOH, recreational drug, or tobacco use. No fever, chest pain, SOB, cough, abdominal pain, dysuria, constipation, diarrhea.     In the ED, labs showed hyperglycemia 465, Crt 1.2, AG 12, VBG pH slightly decreased 7.34, B-HB eleevated 3.2. CXR without acute processes. POCT UA at  with +ketones. Received 1L NS, Zofran 4mg IV, Insulin 8U IV.      I reviewed the ED Provider Note dated 10/2/2023 prior to my evaluation of this patient.  I reviewed all labs and imaging performed in the Main ED, prior to patient being placed in Observation. Patient was placed in the ED Observation Unit for Hyperglycemia.    Observation Course:    Patient resting comfortably in the ED OU.  On insulin.  Labs are stable.  No evidence of DKA.       Endocrinology was consulted and evaluated the patient in the ED OU.  Please see their consult note for recommendations.  Outpatient orders for diabetes medications and testing supplies were placed by Irais Lowery NP. Instructions on insulin pen and glucometer use done by Irais.  Patient reports that he understands all of the instructions that he was provided as well as his medication dosing.  Please see below for copies of handouts provided to the patient.     I  discussed with Irais that the patient's final fingerstick glucose is 307.  She states that the patient can go home and began his medications as directed, or he can be admitted to optimize his blood glucose.  I discussed this with him and the patient has elected to go home and take his medication.  He was given strict ED return precautions.  He is to follow-up in the endocrinology clinic and with outpatient diabetes education.  Referrals have been placed.    Consultants:    Endocrinology    Final Diagnosis:  Hyperglycemia  Diabetes Mellitus, new onset    Discharge Condition: Good    Disposition: Home or Self Care     Time spent on the discharge of the patient including review of hospital course with the patient. reviewing discharge medications and arranging follow-up care 35 minutes.  Patient was seen and examined on the date of discharge and determined to be suitable for discharge.    Follow Up:  Future Appointments   Date Time Provider Department Center   10/6/2023 11:00 AM Brianna Lucio NP [facility]     Follow-up outpatient with endocrinology and outpatient diabetes education.

## 2023-10-03 NOTE — ED NOTES
Diabetic teaching on discharge to just go over all the information that has been given to pt. Pt verbalized understanding and has been reading on diabetic care.

## 2023-10-03 NOTE — ED NOTES
Assumed care at this time. Pt alert and oriented. Pt given number for co-pay on bedside delivery meds. No further needs at this time.

## 2023-10-03 NOTE — ED NOTES
Assumed care of patient Phil Leung, a 33 y.o. male     Triage note:  Chief Complaint   Patient presents with    Hyperglycemia     Sent from   [facility] for machine, new onset,     Review of patient's allergies indicates:  No Known Allergies  History reviewed. No pertinent past medical history.

## 2023-10-03 NOTE — PLAN OF CARE
[facility] - Emergency Dept  Initial Discharge Assessment       Primary Care Provider: Antoinette, Primary Doctor    Admission Diagnosis: Shortness of breath [R06.02]    Admission Date: 10/2/2023  Expected Discharge Date:     Transition of Care Barriers: None    Payor: [employer] / Plan: [facility] CHOICE PLUS / Product Type: Commercial /     Extended Emergency Contact Information  Primary Emergency Contact: Lars Early  Mobile Phone: [phone]  Relation: Father  Preferred language: English   needed? No    Discharge Plan A: Home with family  Discharge Plan B: Home      [facility] - [place], [place] - [address]  [address]  [place] [address]  Phone: [phone] Fax: [phone]      Initial Assessment (most recent)       Adult Discharge Assessment - 10/03/23 1303          Discharge Assessment    Assessment Type Discharge Planning Assessment     Confirmed/corrected address, phone number and insurance Yes     Confirmed Demographics Correct on Facesheet     Source of Information patient     When was your last doctors appointment? --   I'm looking for a new doctor    Does patient/caregiver understand observation status Yes     Communicated MARCO ANTONIO with patient/caregiver Date not available/Unable to determine     People in Home sibling(s);parent(s)     Do you expect to return to your current living situation? Yes     Do you have help at home or someone to help you manage your care at home? Yes     Who are your caregiver(s) and their phone number(s)? Lars Early (Father) - [phone]     Current cognitive status: Alert/Oriented     Equipment Currently Used at Home none     Readmission within 30 days? No     Patient currently being followed by outpatient case management? No     Do you currently have service(s) that help you manage your care at home? No     Do you take prescription medications? No     Do you have prescription coverage? Yes     Do you have any problems affording any of your  prescribed medications? TBD   Concerned about potential cost of diabetes medications    Is the patient taking medications as prescribed? yes     Who is going to help you get home at discharge? Self     How do you get to doctors appointments? car, drives self     Are you on dialysis? No     Do you take coumadin? No     DME Needed Upon Discharge  glucometer     Discharge Plan discussed with: Patient     Transition of Care Barriers None     Discharge Plan A Home with family     Discharge Plan B Home        Physical Activity    On average, how many days per week do you engage in moderate to strenuous exercise (like a brisk walk)? 4 days     On average, how many minutes do you engage in exercise at this level? 60 min        Financial Resource Strain    How hard is it for you to pay for the very basics like food, housing, medical care, and heating? Not very hard        Housing Stability    In the last 12 months, was there a time when you were not able to pay the mortgage or rent on time? No     In the last 12 months, how many places have you lived? 1     In the last 12 months, was there a time when you did not have a steady place to sleep or slept in a shelter (including now)? No        Transportation Needs    In the past 12 months, has lack of transportation kept you from medical appointments or from getting medications? No     In the past 12 months, has lack of transportation kept you from meetings, work, or from getting things needed for daily living? No        Food Insecurity    Within the past 12 months, you worried that your food would run out before you got the money to buy more. Never true     Within the past 12 months, the food you bought just didn't last and you didn't have money to get more. Never true        Stress    Do you feel stress - tense, restless, nervous, or anxious, or unable to sleep at night because your mind is troubled all the time - these days? To some extent        Social Connections    In a  typical week, how many times do you talk on the phone with family, friends, or neighbors? More than three times a week     How often do you get together with friends or relatives? Twice a week     How often do you attend Moravian or Scientology services? 1 to 4 times per year     Do you belong to any clubs or organizations such as Moravian groups, unions, fraternal or athletic groups, or school groups? No     How often do you attend meetings of the clubs or organizations you belong to? Never     Are you , , , , never , or living with a partner? Never         Alcohol Use    Q1: How often do you have a drink containing alcohol? Patient refused     Q2: How many drinks containing alcohol do you have on a typical day when you are drinking? Patient refused     Q3: How often do you have six or more drinks on one occasion? Patient refused        OTHER    Name(s) of People in Home Lars (Father), Brother                   Pt reports living with his father and brother. Pt stated that he does not currently have a primary care doctor and does not want help finding one. He stated that his sister is helping and he wants to choose one that he feels is right for him. [facility] hospital f/u for later in week    Pt reports that he does not use any HME, HH, Dialysis or Coumadin.    D/C plan is home with family. With follow up appointments. No other needs identified at this time.    Nancy Schneider LMSW  Case Management [facility]  [phone]

## 2023-10-03 NOTE — CONSULTS
[facility] - Emergency Dept  Endocrinology  Diabetes Consult Note    Consult Requested by: No att. providers found   Reason for admit: Hyperglycemia    HISTORY OF PRESENT ILLNESS:  Reason for Consult: Management of T2DM, Hyperglycemia     Surgical Procedure and Date: none    Diabetes diagnosis year: 10/2023    Home Diabetes Medications:  none- new onset  Lab Results   Component Value Date    HGBA1C 11.5 (H) 10/02/2023       Diabetes Complications include:     Hyperglycemia     Complicating diabetes co morbidities:   Obesity       HPI:   Patient is a 33 y.o. male with a diagnosis of obesity presenting to the ED with the chief complaint of hyperglycemia. He reports symptoms of polyuria, dry mouth, polydipsia for the past week. He went to urgent care today and instructed to come to the ED after glucose was >500 and UA had +ketones. Denies personal or family history of DM. Reports last being evaluated for DM was 3 years ago. Denies daily medications. No ETOH, recreational drug, or tobacco use. No fever, chest pain, SOB, cough, abdominal pain, dysuria, constipation, diarrhea.     In the ED, labs showed hyperglycemia 465, Crt 1.2, AG 12, VBG pH slightly decreased 7.34, B-HB elevated 3.2. CXR without acute processes. POCT UA at  with +ketones. Received 1L NS, Zofran 4mg IV, Insulin 8U IV.  Endocrinology consulted for management of new onset T2DM.               Interval HPI:   Overnight events: Sent to ED due to elevated labs. BG trending down with IV insulin and correction scale x1 overnight. Remains above goal.   Eating: Diet diabetic 1500 Calorie  Nausea: No  Hypoglycemia and intervention: No  Fever: No  TPN and/or TF: No    PMH, PSH, FH, SH  reviewed     ROS:      Review of Systems    Constitutional: Negative for weight changes.  Eyes: Negative for visual disturbance.  Respiratory: Negative for cough.   Cardiovascular: Negative for chest pain.  Gastrointestinal: Negative for nausea.  Endocrine:+ for polyuria,  polydipsia.  Musculoskeletal: Negative for back pain.  Skin: Negative for rash.  Neurological: Negative for syncope.  Psychiatric/Behavioral: Negative for depression.      Current Medications and/or Treatments Impacting Glycemic Control  Immunotherapy:    Immunosuppressants       None          Steroids:   Hormones (From admission, onward)      None          Pressors:    Autonomic Drugs (From admission, onward)      None          Hyperglycemia/Diabetes Medications:   Antihyperglycemics (From admission, onward)      Start     Stop Route Frequency Ordered    10/03/23 0900  insulin detemir U-100 (Levemir) pen 18 Units         -- SubQ Daily 10/03/23 0648    10/03/23 0800  insulin aspart U-100 pen 6 Units         -- SubQ 3 times daily with meals 10/03/23 0648    10/02/23 1827  insulin aspart U-100 pen 0-5 Units         -- SubQ Before meals & nightly PRN 10/02/23 1729             PHYSICAL EXAMINATION:  Vitals:    10/03/23 0552   BP: 134/89   Pulse: 60   Resp:    Temp: 97.8 °F (36.6 °C)     Body mass index is 40.29 kg/m².     Physical Exam   Constitutional: Well developed, well nourished, obese, NAD.  ENT: External ears no masses with nose patent; normal hearing.  Neck: Supple; trachea midline  Cardiovascular: Normal heart sounds, no LE edema. DP +2 bilaterally.  Lungs: Normal effort; lungs anterior bilaterally clear to auscultation.  Abdomen: Soft, no masses, no hernias.  MS: No clubbing or cyanosis of nails noted; unable to assess gait.  Skin: No rashes, lesions, or ulcers; no nodules.  Psychiatric: Good judgement and insight; normal mood and affect.  Neurological: Cranial nerves are grossly intact.   Foot: nails in good condition, no amputations noted.        Labs Reviewed and Include   Recent Labs   Lab 10/02/23  1538 10/03/23  0513   * 332*   CALCIUM 9.7 8.2*   ALBUMIN 4.3  --    PROT 7.3  --     135*   K 3.9 4.3   CO2 25 21*    104   BUN 9 10   CREATININE 1.2 1.1   ALKPHOS 152*  --    ALT 55*  --   "  AST 47*  --    BILITOT 0.6  --      Lab Results   Component Value Date    WBC 7.42 10/03/2023    HGB 13.1 (L) 10/03/2023    HCT 40.3 10/03/2023    MCV 81 (L) 10/03/2023     10/03/2023     No results for input(s): "TSH", "FREET4" in the last 168 hours.  Lab Results   Component Value Date    HGBA1C 11.5 (H) 10/02/2023       Nutritional status:   Body mass index is 40.29 kg/m².  Lab Results   Component Value Date    ALBUMIN 4.3 10/02/2023     No results found for: "PREALBUMIN"    Estimated Creatinine Clearance: 120.4 mL/min (based on SCr of 1.1 mg/dL).    Accu-Checks  Recent Labs     10/02/23  1437 10/02/23  1723 10/02/23  1758 10/02/23  2352 10/03/23  0308 10/03/23  0754   POCTGLUCOSE 419* 360* 296* 395* 319* 313*        ASSESSMENT and PLAN    Endocrine  * Hyperglycemia  See type 2 DM      Morbid (severe) obesity due to excess calories  May increase insulin resistance.   Body mass index is 40.29 kg/m².    Consider glp1 at discharge.       Type 2 diabetes mellitus with hyperglycemia  New onset DM  BG goal 140-180.     0.3 u/kg wt based  Levemir 18 units daily.(0.3 u/kg dosing)   Increase Novolog to 8 units with meals.   BG monitoring ac/hs and low dose correction scale.     Discharge plans:  Given a1c of 11.5 would recommend MDI regimen at discharge.     -Start Lantus 18 units once daily  -Start Humalog 8 units TID with meals  -Start Humalog Correction Scale prn ac/hs   150 - 200 + 1 unit  201 - 250 + 2 units  251 - 300 + 3 units  301 - 350 + 4 units   > 350   + 5 units    -Start Metformin  mg pills:  1 pill once daily with breakfast or a week THEN  1 pill with breakfast, 1 pill with dinner for a week THEN  1 pill with breakfast, 2 pills with dinner for a week THEN  2 pills with breakfast, 2 pills with dinner     -Glucometer and testing supplies (insurance preferred) and insulin pen needles     -Will schedule for outpatient DM education at [facility] and follow up at [facility]. " Patient to submit BG logs to patient portal message in 3-4 days for review. Instructed to notify [facility] endocrine clinic for any BG < 80 or consistently > 200.     Discharge Teaching:    Reviewed topics related to DM including: the need for insulin, how insulin works, what makes it a high risk medication, the importance of immediate follow up with either PCP or endocrine provider, importance of and how to check BG, how to record BG on logs, how to administer insulin, appropriate insulin administration sites, importance of rotating injection sites, hyper/hypoglycemia, how and when to treat hypoglycemia, when to hold insulin, how the correction scale works, importance of storing unused insulin in the refrigerator, and when to seek medical attention.  Patient verbalized understanding, answered all questions to patient's satisfaction.            Plan discussed with patient, family, and RN at bedside.     Irais Lowery NP  Endocrinology  [facility] - [facility] Dept

## 2023-10-03 NOTE — ASSESSMENT & PLAN NOTE
May increase insulin resistance.   Body mass index is 40.29 kg/m².    Consider glp1 at discharge.

## 2023-10-03 NOTE — ED NOTES
Patient resting in bed on the phone, NAD, request a pitcher of water, which the nurse provided.  Patient has no complaints, resting in bed on RA, AAO x4, stretcher locked, call light in reach, side rails are up.

## 2023-10-03 NOTE — SUBJECTIVE & OBJECTIVE
Interval HPI:   Overnight events: Sent to ED due to elevated labs. BG trending down with IV insulin and correction scale x1 overnight. Remains above goal.   Eating: Diet diabetic 1500 Calorie  Nausea: No  Hypoglycemia and intervention: No  Fever: No  TPN and/or TF: No    PMH, PSH, FH, SH  reviewed     ROS:      Review of Systems    Constitutional: Negative for weight changes.  Eyes: Negative for visual disturbance.  Respiratory: Negative for cough.   Cardiovascular: Negative for chest pain.  Gastrointestinal: Negative for nausea.  Endocrine:+ for polyuria, polydipsia.  Musculoskeletal: Negative for back pain.  Skin: Negative for rash.  Neurological: Negative for syncope.  Psychiatric/Behavioral: Negative for depression.      Current Medications and/or Treatments Impacting Glycemic Control  Immunotherapy:    Immunosuppressants       None          Steroids:   Hormones (From admission, onward)      None          Pressors:    Autonomic Drugs (From admission, onward)      None          Hyperglycemia/Diabetes Medications:   Antihyperglycemics (From admission, onward)      Start     Stop Route Frequency Ordered    10/03/23 0900  insulin detemir U-100 (Levemir) pen 18 Units         -- SubQ Daily 10/03/23 0648    10/03/23 0800  insulin aspart U-100 pen 6 Units         -- SubQ 3 times daily with meals 10/03/23 0648    10/02/23 1827  insulin aspart U-100 pen 0-5 Units         -- SubQ Before meals & nightly PRN 10/02/23 1729             PHYSICAL EXAMINATION:  Vitals:    10/03/23 0552   BP: 134/89   Pulse: 60   Resp:    Temp: 97.8 °F (36.6 °C)     Body mass index is 40.29 kg/m².     Physical Exam   Constitutional: Well developed, well nourished, obese, NAD.  ENT: External ears no masses with nose patent; normal hearing.  Neck: Supple; trachea midline  Cardiovascular: Normal heart sounds, no LE edema. DP +2 bilaterally.  Lungs: Normal effort; lungs anterior bilaterally clear to auscultation.  Abdomen: Soft, no masses, no  hernias.  MS: No clubbing or cyanosis of nails noted; unable to assess gait.  Skin: No rashes, lesions, or ulcers; no nodules.  Psychiatric: Good judgement and insight; normal mood and affect.  Neurological: Cranial nerves are grossly intact.   Foot: nails in good condition, no amputations noted.

## 2023-10-03 NOTE — PHARMACY MED REC
"Admission Medication History     The home medication history was taken by Ashley Hdz.    You may go to "Admission" then "Reconcile Home Medications" tabs to review and/or act upon these items.     The home medication list has been updated by the Pharmacy department.   Please read ALL comments highlighted in yellow.   Please address this information as you see fit.    Feel free to contact us if you have any questions or require assistance.      The medications listed below were removed from the home medication list. Please reorder if appropriate:  Patient reports no longer taking the following medication(s):  CLOTRIMAZOLE-BETAMETHASONE 1-0.05 % CREAM  ONDANSETRON ODT 4 MG TABLET            Potential issues to be addressed PRIOR TO DISCHARGE  Please discuss with the patient barriers to adherence with medication treatment plans  Patient requires education regarding drug therapies     Ashley Hdz  EXT [id number]                  .          "

## 2023-10-03 NOTE — PROGRESS NOTES
ED Observation Unit  Progress Note      HPI   Phil Leung is a 33 y.o. male with medical history of obesity presenting to the ED with the chief complaint of hyperglycemia.     Reports polyuria, dry mouth, polydipsia for the past week. Reports 1 episode of vomiting while brushing his teeth this morning. He went to urgent care today and instructed to come to the ED after glucose was >500 and UA had +ketones. Denies personal or family history of DM. Reports last being evaluated for DM was 3 years ago. Denies daily medications. No ETOH, recreational drug, or tobacco use. No fever, chest pain, SOB, cough, abdominal pain, dysuria, constipation, diarrhea.     In the ED, labs showed hyperglycemia 465, Crt 1.2, AG 12, VBG pH slightly decreased 7.34, B-HB eleevated 3.2. CXR without acute processes. POCT UA at  with +ketones. Received 1L NS, Zofran 4mg IV, Insulin 8U IV.      I reviewed the ED Provider Note dated 10/2/2023 prior to my evaluation of this patient.  I reviewed all labs and imaging performed in the Main ED, prior to patient being placed in Observation. Patient was placed in the ED Observation Unit for Hyperglycemia.    Interval History   Patient resting comfortably in the ED OU.  On insulin.  Labs are stable.  No evidence of DKA.      Endocrinology was consulted and evaluated the patient in the ED OU.  Please see their consult note for recommendations.  Outpatient orders for diabetes medications and testing supplies were placed by Irais Lowery NP. Instructions on insulin pen and glucometer use done by Irais.  Patient reports that he understands all of the instructions that he was provided as well as his medication dosing.  Please see below for copies of handouts provided to the patient.    I discussed with Irais that the patient's final fingerstick glucose is 307.  She states that the patient can go home and began his medications as directed, or he can be admitted to optimize his blood glucose.  I  "discussed this with him and the patient has elected to go home and take his medication.  He was given strict ED return precautions.  He is to follow-up in the endocrinology clinic and with outpatient diabetes education.  Referrals have been placed.    PMHx   History reviewed. No pertinent past medical history.   History reviewed. No pertinent surgical history.     Family Hx   Family History   Problem Relation Age of Onset    No Known Problems Mother     Hypertension Father         Social Hx   Social History     Socioeconomic History    Marital status: Single   Tobacco Use    Smoking status: Never     Passive exposure: Never    Smokeless tobacco: Never        Vital Signs   Vitals:    10/02/23 1437 10/02/23 1900 10/03/23 0552   BP: (!) 154/107 (!) 135/93 134/89   BP Location:   Left arm   Patient Position:   Lying   Pulse: 83 79 60   Resp: 18 15    Temp: 98.3 °F (36.8 °C) 98.1 °F (36.7 °C) 97.8 °F (36.6 °C)   TempSrc: Oral Oral Oral   SpO2: 97% 97% 99%   Weight: 120.2 kg (265 lb)     Height: 5' 8" (1.727 m)          Review of Systems  Genitourinary:  Positive for frequency.   Endo/Heme/Allergies:  Positive for polydipsia.     Brief Physical Exam/Reassessment   Nursing note and vitals reviewed.  Constitutional: He appears well-developed and well-nourished. He is not diaphoretic. No distress.   HENT:   Head: Normocephalic and atraumatic.   Dry mucous membranes +thrush   Neck: Neck supple.   Normal range of motion.  Cardiovascular:  Normal rate, regular rhythm, normal heart sounds and intact distal pulses.     Exam reveals no gallop and no friction rub.       No murmur heard.  Pulmonary/Chest: Breath sounds normal. No respiratory distress. He has no wheezes. He has no rhonchi. He has no rales. He exhibits no tenderness.   Abdominal: Abdomen is soft. Bowel sounds are normal. He exhibits no distension and no mass. There is no abdominal tenderness. There is no rebound and no guarding.   Musculoskeletal:         General: " Normal range of motion.      Cervical back: Normal range of motion and neck supple.      Neurological: He is alert.   Skin: Skin is warm and dry.   Acanthosis nigricans on the posterior neck   Psychiatric: He has a normal mood and affect.     Labs/Imaging   Labs Reviewed   CBC W/ AUTO DIFFERENTIAL - Abnormal; Notable for the following components:       Result Value    MCV 81 (*)     All other components within normal limits    Narrative:     Release to patient->Immediate   COMPREHENSIVE METABOLIC PANEL - Abnormal; Notable for the following components:    Glucose 465 (*)     Alkaline Phosphatase 152 (*)     AST 47 (*)     ALT 55 (*)     All other components within normal limits    Narrative:     Release to patient->Immediate   BETA - HYDROXYBUTYRATE, SERUM - Abnormal; Notable for the following components:    Beta-Hydroxybutyrate 3.2 (*)     All other components within normal limits    Narrative:     Release to patient->Immediate   URINALYSIS, REFLEX TO URINE CULTURE - Abnormal; Notable for the following components:    Specific Gravity, UA >=1.030 (*)     Glucose, UA 3+ (*)     Ketones, UA 3+ (*)     All other components within normal limits    Narrative:     Specimen Source->Urine   HEMOGLOBIN A1C - Abnormal; Notable for the following components:    Hemoglobin A1C 11.5 (*)     Estimated Avg Glucose 283 (*)     All other components within normal limits    Narrative:     Add-on GHGB to [id number] per Yuniel Riddle PA-C on  10/02/2023  17:28     Release to patient->Immediate   OSMOLALITY, SERUM - Abnormal; Notable for the following components:    Osmolality 316 (*)     All other components within normal limits    Narrative:     Add on PHOS to [id number] and OSMO to [id number] per Yuniel Riddle PA-C on  10/02/2023  18:08     Add-on GHGB to [id number] per Yuniel Riddle PA-C on  10/02/2023  17:28     Release to patient->Immediate   BASIC METABOLIC PANEL - Abnormal; Notable for the following components:    Sodium 135 (*)      CO2 21 (*)     Glucose 332 (*)     Calcium 8.2 (*)     All other components within normal limits   CBC W/ AUTO DIFFERENTIAL - Abnormal; Notable for the following components:    Hemoglobin 13.1 (*)     MCV 81 (*)     MCH 26.5 (*)     All other components within normal limits   POCT GLUCOSE - Abnormal; Notable for the following components:    POCT Glucose 419 (*)     All other components within normal limits   ISTAT PROCEDURE - Abnormal; Notable for the following components:    POC PH 7.342 (*)     POC PO2 39 (*)     POC HCO3 23.1 (*)     All other components within normal limits   POCT GLUCOSE - Abnormal; Notable for the following components:    POCT Glucose 296 (*)     All other components within normal limits   POCT GLUCOSE - Abnormal; Notable for the following components:    POCT Glucose 395 (*)     All other components within normal limits   POCT GLUCOSE - Abnormal; Notable for the following components:    POCT Glucose 319 (*)     All other components within normal limits   POCT GLUCOSE - Abnormal; Notable for the following components:    POCT Glucose 360 (*)     All other components within normal limits   MAGNESIUM    Narrative:     Release to patient->Immediate   HIV 1 / 2 ANTIBODY    Narrative:     Release to patient->Immediate   HEPATITIS C ANTIBODY    Narrative:     Release to patient->Immediate   HEMOGLOBIN A1C   OSMOLALITY, SERUM   PHOSPHORUS   PHOSPHORUS    Narrative:     Add on PHOS to [id number] and OSMO to [id number] per Yuniel Riddle PA-C on  10/02/2023  18:08     Add-on GHGB to [id number] per Yuniel Riddle PA-C on  10/02/2023  17:28     Release to patient->Immediate   URINALYSIS MICROSCOPIC    Narrative:     Specimen Source->Urine   POCT GLUCOSE MONITORING CONTINUOUS   POCT GLUCOSE MONITORING CONTINUOUS   POCT GLUCOSE MONITORING CONTINUOUS      Imaging Results              X-Ray Chest PA And Lateral (Final result)  Result time 10/02/23 16:41:44      Final result by Mushtaq Webb MD (10/02/23  16:41:44)                   Impression:      No acute process.      Electronically signed by: Mushtaq Webb MD  Date:    10/02/2023  Time:    16:41               Narrative:    EXAMINATION:  XR CHEST PA AND LATERAL    CLINICAL HISTORY:  Shortness of breath    TECHNIQUE:  PA and lateral views of the chest were performed.    COMPARISON:  None    FINDINGS:  The trachea is unremarkable.  The cardiomediastinal silhouette is within normal limits.  The hilar structures are unremarkable.  There is no evidence of free air beneath the hemidiaphragms.  There are no pleural effusions.  There is no evidence of a pneumothorax.  There is no evidence of pneumomediastinum.  No airspace opacity is present.  The osseous structures are unremarkable.  The subcutaneous tissues are unremarkable.                                       I reviewed all labs and imaging reports pertinent to this ED/EDOU visit.    Plan   Hyperglycemia  -Presenting with polyuria and polydipsia for about a week. No history of DM  -Labs with +B-HB and urine ketones, but anion gap normal, bicarb normal, VBG pH 7.342. Lower suspicion for DKA.  -Checking A1c and serum osmol  -Received 1L NS and Insulin 8units IV in the ED  -Sliding scale insulin  -Endocrinology consult for new onset DM recommendations- please see their note  -Plan for dc home today with outpatient follow up in the endocrinology clinic and with outpatient diabetes education.    I have discussed this case with Yuniel Riddle PA-C via [employer] Secure Chat and endocrinology NP Irais Lowery.

## 2023-10-05 ENCOUNTER — OFFICE VISIT (OUTPATIENT)
Dept: UROLOGY | Facility: CLINIC | Age: 33
End: 2023-10-05
Payer: COMMERCIAL

## 2023-10-05 VITALS
BODY MASS INDEX: 40.91 KG/M2 | DIASTOLIC BLOOD PRESSURE: 89 MMHG | WEIGHT: 269.06 LBS | HEART RATE: 92 BPM | SYSTOLIC BLOOD PRESSURE: 132 MMHG

## 2023-10-05 DIAGNOSIS — N48.1 BALANITIS: Primary | ICD-10-CM

## 2023-10-05 PROCEDURE — 3075F PR MOST RECENT SYSTOLIC BLOOD PRESS GE 130-139MM HG: ICD-10-PCS | Mod: CPTII,S$GLB,, | Performed by: STUDENT IN AN ORGANIZED HEALTH CARE EDUCATION/TRAINING PROGRAM

## 2023-10-05 PROCEDURE — 3008F BODY MASS INDEX DOCD: CPT | Mod: CPTII,S$GLB,, | Performed by: STUDENT IN AN ORGANIZED HEALTH CARE EDUCATION/TRAINING PROGRAM

## 2023-10-05 PROCEDURE — 3046F PR MOST RECENT HEMOGLOBIN A1C LEVEL > 9.0%: ICD-10-PCS | Mod: CPTII,S$GLB,, | Performed by: STUDENT IN AN ORGANIZED HEALTH CARE EDUCATION/TRAINING PROGRAM

## 2023-10-05 PROCEDURE — 99999 PR PBB SHADOW E&M-EST. PATIENT-LVL III: CPT | Mod: PBBFAC,,, | Performed by: STUDENT IN AN ORGANIZED HEALTH CARE EDUCATION/TRAINING PROGRAM

## 2023-10-05 PROCEDURE — 1159F MED LIST DOCD IN RCRD: CPT | Mod: CPTII,S$GLB,, | Performed by: STUDENT IN AN ORGANIZED HEALTH CARE EDUCATION/TRAINING PROGRAM

## 2023-10-05 PROCEDURE — 3079F PR MOST RECENT DIASTOLIC BLOOD PRESSURE 80-89 MM HG: ICD-10-PCS | Mod: CPTII,S$GLB,, | Performed by: STUDENT IN AN ORGANIZED HEALTH CARE EDUCATION/TRAINING PROGRAM

## 2023-10-05 PROCEDURE — 3075F SYST BP GE 130 - 139MM HG: CPT | Mod: CPTII,S$GLB,, | Performed by: STUDENT IN AN ORGANIZED HEALTH CARE EDUCATION/TRAINING PROGRAM

## 2023-10-05 PROCEDURE — 99202 PR OFFICE/OUTPT VISIT, NEW, LEVL II, 15-29 MIN: ICD-10-PCS | Mod: S$GLB,,, | Performed by: STUDENT IN AN ORGANIZED HEALTH CARE EDUCATION/TRAINING PROGRAM

## 2023-10-05 PROCEDURE — 3046F HEMOGLOBIN A1C LEVEL >9.0%: CPT | Mod: CPTII,S$GLB,, | Performed by: STUDENT IN AN ORGANIZED HEALTH CARE EDUCATION/TRAINING PROGRAM

## 2023-10-05 PROCEDURE — 1159F PR MEDICATION LIST DOCUMENTED IN MEDICAL RECORD: ICD-10-PCS | Mod: CPTII,S$GLB,, | Performed by: STUDENT IN AN ORGANIZED HEALTH CARE EDUCATION/TRAINING PROGRAM

## 2023-10-05 PROCEDURE — 3008F PR BODY MASS INDEX (BMI) DOCUMENTED: ICD-10-PCS | Mod: CPTII,S$GLB,, | Performed by: STUDENT IN AN ORGANIZED HEALTH CARE EDUCATION/TRAINING PROGRAM

## 2023-10-05 PROCEDURE — 99202 OFFICE O/P NEW SF 15 MIN: CPT | Mod: S$GLB,,, | Performed by: STUDENT IN AN ORGANIZED HEALTH CARE EDUCATION/TRAINING PROGRAM

## 2023-10-05 PROCEDURE — 3079F DIAST BP 80-89 MM HG: CPT | Mod: CPTII,S$GLB,, | Performed by: STUDENT IN AN ORGANIZED HEALTH CARE EDUCATION/TRAINING PROGRAM

## 2023-10-05 PROCEDURE — 99999 PR PBB SHADOW E&M-EST. PATIENT-LVL III: ICD-10-PCS | Mod: PBBFAC,,, | Performed by: STUDENT IN AN ORGANIZED HEALTH CARE EDUCATION/TRAINING PROGRAM

## 2023-10-05 NOTE — PROGRESS NOTES
"[facility] [place] [id number]   Clinic Note    SUBJECTIVE:     Chief Complaint: balanitis    History of Present Illness:  Phil Leung is a 33 y.o. male who presents to clinic for balanitis. He is new to our clinic referred by Paul Horton NP.       He was recently admitted (10/2-10/3) for hyperglycemia and was diagnosed with DM. He has now been started in insulin. A1c is 11.5%.  He had developed a rash on his penis that he went to urgent care for; he was given a cream that has resolved this. He is unsure of the name of this.    PMH is notable for morbid obesity.  Anticoagulation:  No    OBJECTIVE:     Estimated body mass index is 40.91 kg/m² as calculated from the following:    Height as of 10/2/23: 5' 8" (1.727 m).    Weight as of this encounter: 122 kg (269 lb 1.1 oz).    Vital Signs (Most Recent)  Pulse: 92 (10/05/23 1443)  BP: 132/89 (10/05/23 1443)    Physical Exam  Vitals reviewed.   Constitutional:       Appearance: Normal appearance.   HENT:      Head: Normocephalic and atraumatic.   Eyes:      Conjunctiva/sclera: Conjunctivae normal.   Cardiovascular:      Rate and Rhythm: Normal rate.   Pulmonary:      Effort: Pulmonary effort is normal.   Abdominal:      General: Abdomen is flat. There is no distension.      Tenderness: There is no abdominal tenderness.   Genitourinary:     Penis: Normal.       Comments: Uncircumcised penis, no phimosis, paraphimosis, or balanoposthitis; scrotal exam unremarkable  Musculoskeletal:         General: Normal range of motion.      Cervical back: Normal range of motion.   Skin:     General: Skin is warm and dry.   Neurological:      General: No focal deficit present.      Mental Status: He is alert and oriented to person, place, and time.   Psychiatric:         Mood and Affect: Mood normal.         Behavior: Behavior normal.         Thought Content: Thought content normal.         Judgment: Judgment normal.         Lab Results   Component Value Date    BUN 10 10/03/2023    " CREATININE 1.1 10/03/2023    WBC 7.42 10/03/2023    HGB 13.1 (L) 10/03/2023    HCT 40.3 10/03/2023     10/03/2023    AST 47 (H) 10/02/2023    ALT 55 (H) 10/02/2023    ALKPHOS 152 (H) 10/02/2023    ALBUMIN 4.3 10/02/2023    HGBA1C 11.5 (H) 10/02/2023          ASSESSMENT     1. Balanitis      PLAN:   1. Balanitis         For balanoposthitis, encourage prevention with good blood sugar control and washing under foreskin at least once daily.   If it reoccurs, he was advised to treat it with OTC clotrimazole cream; if this fails, he can add 1% hydrocortisone for a brief period. If these fail to improve his condition, he should come in for evaluation.    Nathan Paulino MD     Letter to Paul Horton NP

## 2023-10-06 ENCOUNTER — OFFICE VISIT (OUTPATIENT)
Dept: PRIMARY CARE CLINIC | Facility: CLINIC | Age: 33
End: 2023-10-06
Payer: COMMERCIAL

## 2023-10-06 ENCOUNTER — PATIENT MESSAGE (OUTPATIENT)
Dept: ENDOCRINOLOGY | Facility: CLINIC | Age: 33
End: 2023-10-06

## 2023-10-06 ENCOUNTER — OFFICE VISIT (OUTPATIENT)
Dept: ENDOCRINOLOGY | Facility: CLINIC | Age: 33
End: 2023-10-06
Payer: COMMERCIAL

## 2023-10-06 VITALS
BODY MASS INDEX: 40.44 KG/M2 | SYSTOLIC BLOOD PRESSURE: 126 MMHG | DIASTOLIC BLOOD PRESSURE: 86 MMHG | OXYGEN SATURATION: 96 % | HEIGHT: 68 IN | TEMPERATURE: 98 F | HEART RATE: 85 BPM | WEIGHT: 266.81 LBS

## 2023-10-06 DIAGNOSIS — E66.01 CLASS 3 SEVERE OBESITY DUE TO EXCESS CALORIES WITH SERIOUS COMORBIDITY AND BODY MASS INDEX (BMI) OF 40.0 TO 44.9 IN ADULT: ICD-10-CM

## 2023-10-06 DIAGNOSIS — Z00.00 ROUTINE MEDICAL EXAM: Primary | ICD-10-CM

## 2023-10-06 DIAGNOSIS — E11.65 TYPE 2 DIABETES MELLITUS WITH HYPERGLYCEMIA, WITHOUT LONG-TERM CURRENT USE OF INSULIN: Primary | ICD-10-CM

## 2023-10-06 DIAGNOSIS — E11.9 TYPE 2 DIABETES MELLITUS WITHOUT COMPLICATION, WITHOUT LONG-TERM CURRENT USE OF INSULIN: ICD-10-CM

## 2023-10-06 PROCEDURE — 3046F PR MOST RECENT HEMOGLOBIN A1C LEVEL > 9.0%: ICD-10-PCS | Mod: CPTII,S$GLB,, | Performed by: NURSE PRACTITIONER

## 2023-10-06 PROCEDURE — 1159F PR MEDICATION LIST DOCUMENTED IN MEDICAL RECORD: ICD-10-PCS | Mod: CPTII,S$GLB,, | Performed by: NURSE PRACTITIONER

## 2023-10-06 PROCEDURE — 99999 PR PBB SHADOW E&M-EST. PATIENT-LVL V: CPT | Mod: PBBFAC,,, | Performed by: NURSE PRACTITIONER

## 2023-10-06 PROCEDURE — 3046F PR MOST RECENT HEMOGLOBIN A1C LEVEL > 9.0%: ICD-10-PCS | Mod: CPTII,95,, | Performed by: NURSE PRACTITIONER

## 2023-10-06 PROCEDURE — 3074F PR MOST RECENT SYSTOLIC BLOOD PRESSURE < 130 MM HG: ICD-10-PCS | Mod: CPTII,S$GLB,, | Performed by: NURSE PRACTITIONER

## 2023-10-06 PROCEDURE — 3046F HEMOGLOBIN A1C LEVEL >9.0%: CPT | Mod: CPTII,S$GLB,, | Performed by: NURSE PRACTITIONER

## 2023-10-06 PROCEDURE — 1160F PR REVIEW ALL MEDS BY PRESCRIBER/CLIN PHARMACIST DOCUMENTED: ICD-10-PCS | Mod: CPTII,S$GLB,, | Performed by: NURSE PRACTITIONER

## 2023-10-06 PROCEDURE — 3008F PR BODY MASS INDEX (BMI) DOCUMENTED: ICD-10-PCS | Mod: CPTII,S$GLB,, | Performed by: NURSE PRACTITIONER

## 2023-10-06 PROCEDURE — 99214 OFFICE O/P EST MOD 30 MIN: CPT | Mod: 95,,, | Performed by: NURSE PRACTITIONER

## 2023-10-06 PROCEDURE — 3008F BODY MASS INDEX DOCD: CPT | Mod: CPTII,S$GLB,, | Performed by: NURSE PRACTITIONER

## 2023-10-06 PROCEDURE — 99214 OFFICE O/P EST MOD 30 MIN: CPT | Mod: S$GLB,,, | Performed by: NURSE PRACTITIONER

## 2023-10-06 PROCEDURE — 99999 PR PBB SHADOW E&M-EST. PATIENT-LVL V: ICD-10-PCS | Mod: PBBFAC,,, | Performed by: NURSE PRACTITIONER

## 2023-10-06 PROCEDURE — 3074F SYST BP LT 130 MM HG: CPT | Mod: CPTII,S$GLB,, | Performed by: NURSE PRACTITIONER

## 2023-10-06 PROCEDURE — 3079F PR MOST RECENT DIASTOLIC BLOOD PRESSURE 80-89 MM HG: ICD-10-PCS | Mod: CPTII,S$GLB,, | Performed by: NURSE PRACTITIONER

## 2023-10-06 PROCEDURE — 1159F MED LIST DOCD IN RCRD: CPT | Mod: CPTII,S$GLB,, | Performed by: NURSE PRACTITIONER

## 2023-10-06 PROCEDURE — 1160F RVW MEDS BY RX/DR IN RCRD: CPT | Mod: CPTII,S$GLB,, | Performed by: NURSE PRACTITIONER

## 2023-10-06 PROCEDURE — 3046F HEMOGLOBIN A1C LEVEL >9.0%: CPT | Mod: CPTII,95,, | Performed by: NURSE PRACTITIONER

## 2023-10-06 PROCEDURE — 99214 PR OFFICE/OUTPT VISIT, EST, LEVL IV, 30-39 MIN: ICD-10-PCS | Mod: 95,,, | Performed by: NURSE PRACTITIONER

## 2023-10-06 PROCEDURE — 3079F DIAST BP 80-89 MM HG: CPT | Mod: CPTII,S$GLB,, | Performed by: NURSE PRACTITIONER

## 2023-10-06 PROCEDURE — 99214 PR OFFICE/OUTPT VISIT, EST, LEVL IV, 30-39 MIN: ICD-10-PCS | Mod: S$GLB,,, | Performed by: NURSE PRACTITIONER

## 2023-10-06 RX ORDER — METFORMIN HYDROCHLORIDE 500 MG/1
1000 TABLET, EXTENDED RELEASE ORAL 2 TIMES DAILY WITH MEALS
Qty: 360 TABLET | Refills: 3 | Status: SHIPPED | OUTPATIENT
Start: 2023-10-06 | End: 2024-01-02 | Stop reason: SDUPTHER

## 2023-10-06 NOTE — PROGRESS NOTES
nSubjective     Patient ID: Phil Leung is a 33 y.o. male.    Chief Complaint: Diabetes    The patient location is: Home   The chief complaint leading to consultation is: diabetes     Visit type: audiovisual    Face to Face time with patient: 20 min   30  minutes of total time spent on the encounter, which includes face to face time and non-face to face time preparing to see the patient (eg, review of tests), Obtaining and/or reviewing separately obtained history, Documenting clinical information in the electronic or other health record, Independently interpreting results (not separately reported) and communicating results to the patient/family/caregiver, or Care coordination (not separately reported).     Each patient to whom he or she provides medical services by telemedicine is:  (1) informed of the relationship between the physician and patient and the respective role of any other health care provider with respect to management of the patient; and (2) notified that he or she may decline to receive medical services by telemedicine and may withdraw from such care at any time.    Notes:  covid   HPI  Pt is a 33 y.o. male  with a diagnosis of Type 2 diabetes mellitus diagnosed approximately October 2023 for c/o increased polyuria, dipsia and weakness. , and pt started on basal/bolus and advised to f/u outpatient:.  Denies family hx of DM.  Was eating a lot of fast food, and drinking eveything to help with thirst.  Did not start meformin as pharmacy did not have rx.     Checking glucoses QID   Fbs ~200  Lunch--201, 193, 184,   Supper 290, 191, 211  Bt 146, 395     Current DM meds:   Lantus 18 units qhs, novolog 8-8-8 plus ss. Metformin 1000 mg xr bid.        Failed DM meds:  na        Back up plan for insulin pump hiatus:   Statin: none        Not tolerated statin : na   ACE/ARB:none        Not tolerated ACE/ARB: na   Known Diabetic complications: none         Passwords for Tech Accounts: na       Review of  Systems   Constitutional:  Negative for appetite change and unexpected weight change.   HENT:  Negative for hearing loss and trouble swallowing.    Eyes:  Negative for photophobia and visual disturbance.   Respiratory:  Negative for cough and shortness of breath.    Cardiovascular:  Negative for chest pain and leg swelling.   Gastrointestinal:  Negative for constipation and diarrhea.   Endocrine: Positive for polyuria (much improved).   Genitourinary:  Negative for difficulty urinating and urgency.   Musculoskeletal:  Negative for arthralgias and myalgias.   Neurological:  Negative for weakness and numbness.   Psychiatric/Behavioral:  Negative for sleep disturbance. The patient is not nervous/anxious.         Objective     Physical Exam  Constitutional:       Appearance: Normal appearance. He is obese.   HENT:      Head: Normocephalic and atraumatic.      Nose: Nose normal.   Neurological:      General: No focal deficit present.      Mental Status: He is alert and oriented to person, place, and time.   Psychiatric:         Mood and Affect: Mood normal.         Behavior: Behavior normal.         Thought Content: Thought content normal.         Judgment: Judgment normal.        There were no vitals taken for this visit.    Hemoglobin A1C   Date Value Ref Range Status   10/02/2023 11.5 (H) 4.0 - 5.6 % Final     Comment:     ADA Screening Guidelines:  5.7-6.4%  Consistent with prediabetes  >or=6.5%  Consistent with diabetes    High levels of fetal hemoglobin interfere with the HbA1C  assay. Heterozygous hemoglobin variants (HbS, HgC, etc)do  not significantly interfere with this assay.   However, presence of multiple variants may affect accuracy.         Chemistry        Component Value Date/Time     (L) 10/03/2023 0513    K 4.3 10/03/2023 0513     10/03/2023 0513    CO2 21 (L) 10/03/2023 0513    BUN 10 10/03/2023 0513    CREATININE 1.1 10/03/2023 0513     (H) 10/03/2023 0513        Component Value  "Date/Time    CALCIUM 8.2 (L) 10/03/2023 0513    ALKPHOS 152 (H) 10/02/2023 1538    AST 47 (H) 10/02/2023 1538    ALT 55 (H) 10/02/2023 1538    BILITOT 0.6 10/02/2023 1538          No results found for: "CHOL"  No results found for: "HDL"  No results found for: "LDLCALC"  No results found for: "TRIG"  No results found for: "CHOLHDL"  No results found for: "MICALBCREAT"  No results found for: "TSH"  No results found for: "WODLUGOY50XC"    Assessment and Plan     1. Type 2 diabetes mellitus with hyperglycemia, without long-term current use of insulin  Ambulatory referral/consult to Endocrinology    metFORMIN (GLUCOPHAGE-XR) 500 MG ER 24hr tablet    Ambulatory referral/consult to Diabetes Education      2. Class 3 severe obesity due to excess calories with serious comorbidity and body mass index (BMI) of 40.0 to 44.9 in adult              Plan   Suspect glucose toxicity, will likely david with improved glucoses.   Start metformin --advised this will kick in and lower glcuoses more.   Once glucoses start hovering lower 100 range anticipated stopping prandial insulin, and later basal.   Could consider GLP in future.     Advised to send glcuose logs over in a week and I will review and advise     If long term f/u needed will need to establish at [facility].      Cont 18 lantus at bedtime  Cont Novolog 8 with meals plus ss.   Start metformin.   ORDERS 10/06/2023     Cons Diabetes Ed--[facility]  Virtual with me 1 mo.         No follow-ups on file.    "

## 2023-10-06 NOTE — PATIENT INSTRUCTIONS
Cont 18 lantus at bedtime  Cont Novolog 8 with meals plus ss.   Start metformin.         Metformin--1 tablet in morning for 5 days                      1 tablet twice a day for 5 days                       2 tablet in morning and 1 evening for 5 days                       Then 1 whole tablet twice a day.        If GI side effects become intolerable--may stop at dose you were tolerating.

## 2023-10-10 ENCOUNTER — PATIENT MESSAGE (OUTPATIENT)
Dept: ENDOCRINOLOGY | Facility: CLINIC | Age: 33
End: 2023-10-10
Payer: COMMERCIAL

## 2023-10-13 ENCOUNTER — PATIENT MESSAGE (OUTPATIENT)
Dept: ENDOCRINOLOGY | Facility: CLINIC | Age: 33
End: 2023-10-13
Payer: COMMERCIAL

## 2023-11-09 ENCOUNTER — PATIENT MESSAGE (OUTPATIENT)
Dept: ENDOCRINOLOGY | Facility: CLINIC | Age: 33
End: 2023-11-09

## 2023-11-09 ENCOUNTER — OFFICE VISIT (OUTPATIENT)
Dept: ENDOCRINOLOGY | Facility: CLINIC | Age: 33
End: 2023-11-09
Payer: COMMERCIAL

## 2023-11-09 DIAGNOSIS — E11.9 TYPE 2 DIABETES MELLITUS WITHOUT COMPLICATION, WITHOUT LONG-TERM CURRENT USE OF INSULIN: Primary | ICD-10-CM

## 2023-11-09 DIAGNOSIS — E66.01 CLASS 3 SEVERE OBESITY DUE TO EXCESS CALORIES WITH SERIOUS COMORBIDITY AND BODY MASS INDEX (BMI) OF 40.0 TO 44.9 IN ADULT: ICD-10-CM

## 2023-11-09 PROCEDURE — 3046F HEMOGLOBIN A1C LEVEL >9.0%: CPT | Mod: CPTII,95,, | Performed by: NURSE PRACTITIONER

## 2023-11-09 PROCEDURE — 99213 OFFICE O/P EST LOW 20 MIN: CPT | Mod: 95,,, | Performed by: NURSE PRACTITIONER

## 2023-11-09 PROCEDURE — 99213 PR OFFICE/OUTPT VISIT, EST, LEVL III, 20-29 MIN: ICD-10-PCS | Mod: 95,,, | Performed by: NURSE PRACTITIONER

## 2023-11-09 PROCEDURE — 3046F PR MOST RECENT HEMOGLOBIN A1C LEVEL > 9.0%: ICD-10-PCS | Mod: CPTII,95,, | Performed by: NURSE PRACTITIONER

## 2023-11-09 NOTE — PROGRESS NOTES
nSubjective     Patient ID: Phil Leung is a 33 y.o. male.    Chief Complaint: diabetes     The patient location is: Home   The chief complaint leading to consultation is: diabetes     Visit type: audiovisual    Face to Face time with patient: 18  min   25 minutes of total time spent on the encounter, which includes face to face time and non-face to face time preparing to see the patient (eg, review of tests), Obtaining and/or reviewing separately obtained history, Documenting clinical information in the electronic or other health record, Independently interpreting results (not separately reported) and communicating results to the patient/family/caregiver, or Care coordination (not separately reported).     Each patient to whom he or she provides medical services by telemedicine is:  (1) informed of the relationship between the physician and patient and the respective role of any other health care provider with respect to management of the patient; and (2) notified that he or she may decline to receive medical services by telemedicine and may withdraw from such care at any time.    Notes:  covid     HPI Oct 6, 2023:   Pt is a 33 y.o. male  with a diagnosis of Type 2 diabetes mellitus diagnosed approximately October 2023 for c/o increased polyuria, dipsia and weakness. , and pt started on basal/bolus and advised to f/u outpatient:.  Denies family hx of DM.  Was eating a lot of fast food, and drinking eveything to help with thirst.     Nov 9, 2023:  Pt started metformin, and gluoses started hovering 70-90's so stopped insulin > 2 weeks ago. Does report being symptomatic with a glucose in 70's which was last week. OVerall doing well. No focal complaints.      FBS--96  Lunch--70-8  Supper 80's      Oct 6, 2023 --Pt new to me having been seen at [facility]--d/c on basal bolus.  Pharmacy did not have metformin in stock so did not take.    Checking glucoses QID   Fbs ~200  Lunch--201, 193, 184,   Supper 290, 191,  211  Bt 146, 395     Current DM meds:   . Metformin 1000 mg xr bid.        Failed DM meds: Lantus 18 units qhs, novolog 8-8-8 plus ss--d/c related to abating needs.        Back up plan for insulin pump hiatus:   Statin: none        Not tolerated statin : na   ACE/ARB:none        Not tolerated ACE/ARB: na   Known Diabetic complications: none         Passwords for [employer]: na       Review of Systems   Constitutional:  Negative for appetite change and unexpected weight change.   HENT:  Negative for hearing loss and trouble swallowing.    Eyes:  Negative for photophobia and visual disturbance.   Respiratory:  Negative for cough and shortness of breath.    Cardiovascular:  Negative for chest pain and leg swelling.   Gastrointestinal:  Negative for constipation and diarrhea.   Endocrine: Positive for polyuria (much improved).   Genitourinary:  Negative for difficulty urinating and urgency.   Musculoskeletal:  Negative for arthralgias and myalgias.   Neurological:  Negative for weakness and numbness.   Psychiatric/Behavioral:  Negative for sleep disturbance. The patient is not nervous/anxious.           Objective     Physical Exam  Constitutional:       Appearance: Normal appearance. He is obese.   HENT:      Head: Normocephalic and atraumatic.      Nose: Nose normal.   Neurological:      General: No focal deficit present.      Mental Status: He is alert and oriented to person, place, and time.   Psychiatric:         Mood and Affect: Mood normal.         Behavior: Behavior normal.         Thought Content: Thought content normal.         Judgment: Judgment normal.          There were no vitals taken for this visit.    Hemoglobin A1C   Date Value Ref Range Status   10/02/2023 11.5 (H) 4.0 - 5.6 % Final     Comment:     ADA Screening Guidelines:  5.7-6.4%  Consistent with prediabetes  >or=6.5%  Consistent with diabetes    High levels of fetal hemoglobin interfere with the HbA1C  assay. Heterozygous hemoglobin variants  "(HbS, HgC, etc)do  not significantly interfere with this assay.   However, presence of multiple variants may affect accuracy.         Chemistry        Component Value Date/Time     (L) 10/03/2023 0513    K 4.3 10/03/2023 0513     10/03/2023 0513    CO2 21 (L) 10/03/2023 0513    BUN 10 10/03/2023 0513    CREATININE 1.1 10/03/2023 0513     (H) 10/03/2023 0513        Component Value Date/Time    CALCIUM 8.2 (L) 10/03/2023 0513    ALKPHOS 152 (H) 10/02/2023 1538    AST 47 (H) 10/02/2023 1538    ALT 55 (H) 10/02/2023 1538    BILITOT 0.6 10/02/2023 1538          No results found for: "CHOL"  No results found for: "HDL"  No results found for: "LDLCALC"  No results found for: "TRIG"  No results found for: "CHOLHDL"  No results found for: "MICALBCREAT"  No results found for: "TSH"  No results found for: "[id number]"    Assessment and Plan   1. Type 2 diabetes mellitus without complication, without long-term current use of insulin        2. Class 3 severe obesity due to excess calories with serious comorbidity and body mass index (BMI) of 40.0 to 44.9 in adult              Plan     Can decrease metformin er to 500 mg bid.   Can use Humalog ss if in event glucose exacerbations return.    F/u primary care,  advised a1c after first of year.      Welcome to return prn.   "

## 2023-11-09 NOTE — PATIENT INSTRUCTIONS
Can decrease metformin er to 500 mg bid.   Can use Humalog ss if in event glucose exacerbations return.    F/u primary care,  advised a1c after first of year.      Welcome to return prn.         Novolog/or Humalog/ USE ONLY    Dose is based on level of glucose before meals only (meaning no food or drink for 4 hours). This dose may be added to a standard meal dose if ordered    150-200=+2  201-250=+4  251-300=+6  301-350=+8  351-400=+10

## 2023-11-22 RX ORDER — DEXTROSE 4 G
TABLET,CHEWABLE ORAL
Qty: 1 EACH | Refills: 0 | Status: CANCELLED | OUTPATIENT
Start: 2023-11-22

## 2024-01-02 DIAGNOSIS — E11.65 TYPE 2 DIABETES MELLITUS WITH HYPERGLYCEMIA, WITHOUT LONG-TERM CURRENT USE OF INSULIN: ICD-10-CM

## 2024-01-02 RX ORDER — METFORMIN HYDROCHLORIDE 500 MG/1
500 TABLET, EXTENDED RELEASE ORAL 2 TIMES DAILY WITH MEALS
Qty: 180 TABLET | Refills: 3 | Status: SHIPPED | OUTPATIENT
Start: 2024-01-02 | End: 2024-02-06 | Stop reason: SDUPTHER

## 2024-02-06 DIAGNOSIS — E11.65 TYPE 2 DIABETES MELLITUS WITH HYPERGLYCEMIA, WITHOUT LONG-TERM CURRENT USE OF INSULIN: ICD-10-CM

## 2024-02-07 RX ORDER — METFORMIN HYDROCHLORIDE 500 MG/1
500 TABLET, EXTENDED RELEASE ORAL 2 TIMES DAILY WITH MEALS
Qty: 180 TABLET | Refills: 1 | Status: SHIPPED | OUTPATIENT
Start: 2024-02-07 | End: 2024-03-19 | Stop reason: SDUPTHER

## 2024-03-19 DIAGNOSIS — E11.65 TYPE 2 DIABETES MELLITUS WITH HYPERGLYCEMIA, WITHOUT LONG-TERM CURRENT USE OF INSULIN: ICD-10-CM

## 2024-03-20 RX ORDER — METFORMIN HYDROCHLORIDE 500 MG/1
500 TABLET, EXTENDED RELEASE ORAL 2 TIMES DAILY WITH MEALS
Qty: 180 TABLET | Refills: 1 | Status: SHIPPED | OUTPATIENT
Start: 2024-03-20 | End: 2025-03-20

## 2024-07-01 ENCOUNTER — PATIENT MESSAGE (OUTPATIENT)
Dept: ADMINISTRATIVE | Facility: HOSPITAL | Age: 34
End: 2024-07-01
Payer: COMMERCIAL

## 2024-09-30 ENCOUNTER — PATIENT MESSAGE (OUTPATIENT)
Dept: ADMINISTRATIVE | Facility: HOSPITAL | Age: 34
End: 2024-09-30
Payer: COMMERCIAL

## 2025-02-19 DIAGNOSIS — E11.9 DIABETES MELLITUS, NEW ONSET: ICD-10-CM

## 2025-02-19 DIAGNOSIS — E11.9 TYPE 2 DIABETES MELLITUS WITHOUT COMPLICATION, UNSPECIFIED WHETHER LONG TERM INSULIN USE: ICD-10-CM

## 2025-02-19 DIAGNOSIS — E11.9 TYPE 2 DIABETES MELLITUS WITHOUT COMPLICATION: ICD-10-CM
